# Patient Record
Sex: FEMALE | Race: WHITE | URBAN - METROPOLITAN AREA
[De-identification: names, ages, dates, MRNs, and addresses within clinical notes are randomized per-mention and may not be internally consistent; named-entity substitution may affect disease eponyms.]

---

## 2024-02-27 ENCOUNTER — OFFICE (OUTPATIENT)
Dept: URBAN - METROPOLITAN AREA CLINIC 76 | Facility: CLINIC | Age: 54
End: 2024-02-27
Payer: COMMERCIAL

## 2024-02-27 VITALS
OXYGEN SATURATION: 95 % | DIASTOLIC BLOOD PRESSURE: 76 MMHG | SYSTOLIC BLOOD PRESSURE: 122 MMHG | WEIGHT: 262 LBS | HEART RATE: 84 BPM | HEIGHT: 62 IN

## 2024-02-27 DIAGNOSIS — K21.9 GASTRO-ESOPHAGEAL REFLUX DISEASE WITHOUT ESOPHAGITIS: ICD-10-CM

## 2024-02-27 DIAGNOSIS — K22.70 BARRETT'S ESOPHAGUS WITHOUT DYSPLASIA: ICD-10-CM

## 2024-02-27 DIAGNOSIS — K58.1 IRRITABLE BOWEL SYNDROME WITH CONSTIPATION: ICD-10-CM

## 2024-02-27 PROCEDURE — 99204 OFFICE O/P NEW MOD 45 MIN: CPT | Performed by: INTERNAL MEDICINE

## 2024-02-27 RX ORDER — ESOMEPRAZOLE MAGNESIUM 40 MG/1
40 CAPSULE, DELAYED RELEASE PELLETS ORAL
Qty: 90 | Refills: 2 | Status: ACTIVE

## 2024-02-27 RX ORDER — LINACLOTIDE 72 UG/1
CAPSULE, GELATIN COATED ORAL
Qty: 90 | Refills: 2 | Status: ACTIVE
Start: 2024-02-27

## 2024-05-30 ENCOUNTER — OFFICE VISIT (OUTPATIENT)
Dept: ORTHOPEDIC SURGERY | Facility: CLINIC | Age: 54
End: 2024-05-30
Payer: COMMERCIAL

## 2024-05-30 VITALS — BODY MASS INDEX: 49.13 KG/M2 | HEIGHT: 62 IN | WEIGHT: 267 LBS | TEMPERATURE: 98.7 F

## 2024-05-30 DIAGNOSIS — M25.571 ACUTE RIGHT ANKLE PAIN: ICD-10-CM

## 2024-05-30 DIAGNOSIS — R52 PAIN: Primary | ICD-10-CM

## 2024-05-30 DIAGNOSIS — S86.001A ACHILLES TENDON INJURY, RIGHT, INITIAL ENCOUNTER: ICD-10-CM

## 2024-05-30 RX ORDER — AMITRIPTYLINE HYDROCHLORIDE 50 MG/1
TABLET, FILM COATED ORAL
COMMUNITY

## 2024-05-30 RX ORDER — CETIRIZINE HYDROCHLORIDE 10 MG/1
TABLET ORAL
COMMUNITY

## 2024-05-30 RX ORDER — LINACLOTIDE 72 UG/1
72 CAPSULE, GELATIN COATED ORAL
COMMUNITY
Start: 2024-02-27

## 2024-05-30 RX ORDER — PREGABALIN 100 MG/1
100 CAPSULE ORAL
COMMUNITY
Start: 2024-04-18

## 2024-05-30 RX ORDER — MELOXICAM 15 MG/1
15 TABLET ORAL
COMMUNITY
Start: 2024-04-18

## 2024-05-30 RX ORDER — INSULIN ASPART 100 [IU]/ML
INJECTION, SOLUTION INTRAVENOUS; SUBCUTANEOUS
COMMUNITY

## 2024-05-30 RX ORDER — SPIRONOLACTONE 25 MG/1
25 TABLET ORAL DAILY
COMMUNITY

## 2024-05-30 RX ORDER — AMLODIPINE BESYLATE 5 MG/1
5 TABLET ORAL DAILY
COMMUNITY

## 2024-05-30 RX ORDER — TRAZODONE HYDROCHLORIDE 50 MG/1
50 TABLET ORAL NIGHTLY PRN
COMMUNITY

## 2024-05-30 RX ORDER — ESOMEPRAZOLE MAGNESIUM 40 MG/1
1 CAPSULE, DELAYED RELEASE ORAL DAILY
COMMUNITY
Start: 2024-05-05

## 2024-05-30 RX ORDER — LORATADINE 10 MG/1
10 TABLET ORAL
COMMUNITY
Start: 2024-04-18

## 2024-05-30 RX ORDER — SEMAGLUTIDE 0.68 MG/ML
INJECTION, SOLUTION SUBCUTANEOUS
COMMUNITY
Start: 2024-05-21

## 2024-05-30 NOTE — PROGRESS NOTES
Patient Name: Marilin Leone   YOB: 1970  Referring Primary Care Physician: Olena Leavitt APRN  BMI: Body mass index is 48.83 kg/m².    Chief Complaint:    Chief Complaint   Patient presents with    Right Ankle - Pain, Follow-up    Right Foot - Pain, Follow-up        HPI: New pt that presents with pain in right ankle after stepping in a hole and fell on 5-22-24. No hx of ankle problems. Pt went to ER and was placed in a cam boot and walker and here for followup.     Marilin Leone is a 54 y.o. female who presents today for evaluation of   Chief Complaint   Patient presents with    Right Ankle - Pain, Follow-up    Right Foot - Pain, Follow-up         Subjective   Medications:   Home Medications:  Current Outpatient Medications on File Prior to Visit   Medication Sig    amitriptyline (ELAVIL) 50 MG tablet 30    amLODIPine (NORVASC) 5 MG tablet Take 1 tablet by mouth Daily.    ascorbic acid (VITAMIN C) 1000 MG tablet controlled-release CR tablet tablet    cetirizine (zyrTEC) 10 MG tablet TAKE 1 TABLET BY MOUTH EVERY MORNING FOR ALLERGIES    esomeprazole (nexIUM) 40 MG capsule Take 1 capsule by mouth Daily.    Linzess 72 MCG capsule capsule 1 capsule.    loratadine (CLARITIN) 10 MG tablet 1 tablet.    meloxicam (MOBIC) 15 MG tablet 1 tablet.    Methylcobalamin 1 MG chewable tablet tablet    NovoLOG 100 UNIT/ML injection USE AS DIRECTED VIA INSULIN PUMP MAX  UNITS DAILY    Ozempic, 0.25 or 0.5 MG/DOSE, 2 MG/3ML solution pen-injector INJECT 0.25MG INTO THE SKIN EVERY WEEK FOR 4 WEEKS THEN INCREASE TO 0.5MG EVERY WEEK IF TOLERATED    pregabalin (LYRICA) 100 MG capsule 1 capsule.    spironolactone (ALDACTONE) 25 MG tablet Take 1 tablet by mouth Daily.    traZODone (DESYREL) 50 MG tablet Take 1 tablet by mouth At Night As Needed. for sleep    vitamin D3 125 MCG (5000 UT) capsule capsule Take 1 capsule by mouth Daily.     No current facility-administered medications on file prior to visit.      Current Medications:  Scheduled Meds:  Continuous Infusions:No current facility-administered medications for this visit.    PRN Meds:.    I have reviewed the patient's medical history in detail and updated the computerized patient record.  Review and summarization of old records includes:    Past Medical History:   Diagnosis Date    Diabetes     HBP (high blood pressure)         Past Surgical History:   Procedure Laterality Date    BREAST BIOPSY Left     KIDNEY STONE SURGERY      OTHER SURGICAL HISTORY      abdominal abcess removed        Social History     Occupational History    Not on file   Tobacco Use    Smoking status: Former     Types: Cigarettes    Smokeless tobacco: Not on file   Substance and Sexual Activity    Alcohol use: Never    Drug use: Defer    Sexual activity: Defer      Social History     Social History Narrative    Not on file        Family History   Problem Relation Age of Onset    Diabetes Father     Heart disease Father     Hypertension Father        ROS: 14 point review of systems was performed and all other systems were reviewed and are negative except for documented findings in HPI and today's encounter.     Allergies: Not on File  Constitutional:  Denies fever, shaking or chills   Eyes:  Denies change in visual acuity   HENT:  Denies nasal congestion or sore throat   Respiratory:  Denies cough or shortness of breath   Cardiovascular:  Denies chest pain or severe LE edema   GI:  Denies abdominal pain, nausea, vomiting, bloody stools or diarrhea   Musculoskeletal:  Numbness, tingling, pain, or loss of motor function only as noted above in history of present illness.  : Denies painful urination or hematuria  Integument:  Denies rash, lesion or ulceration   Neurologic:  Denies headache or focal weakness  Endocrine:  Denies lymphadenopathy  Psych:  Denies confusion or change in mental status   Hem:  Denies active bleeding    OBJECTIVE:  Physical Exam: 54 y.o. female  Wt Readings from Last  "3 Encounters:   05/30/24 121 kg (267 lb)     Ht Readings from Last 1 Encounters:   05/30/24 157.5 cm (62\")     Body mass index is 48.83 kg/m².  Vitals:    05/30/24 1335   Temp: 98.7 °F (37.1 °C)     Vital signs reviewed.     General Appearance:    Alert, cooperative, in no acute distress                  Eyes: conjunctiva clear  ENT: external ears and nose atraumatic  CV: no peripheral edema  Resp: normal respiratory effort  Skin: no rashes or wounds; normal turgor  Psych: mood and affect appropriate  Lymph: no nodes appreciated  Neuro: gross sensation intact  Vascular:  Palpable peripheral pulse in noted extremity  Musculoskeletal Extremities: skin warm, dry and intact with calf soft and nttp, knee nttp, achilles ttp with edema and ecchymosis with palpable deficit and pain with dorsiflexion and plantar flexion     Radiology:   3 views right foot done for pain with no comparison view no fracture  3 views right ankle done for pain with no comparison views no fracture     Assessment:     ICD-10-CM ICD-9-CM   1. Pain  R52 780.96   2. Acute right ankle pain  M25.571 719.47     338.19   3. Achilles tendon injury, right, initial encounter  S86.001A 959.7        Procedures  Cam boot tall with heel lift and mri for achilles injury possible tear     MDM/Plan:   The diagnosis(es), natural history, pathophysiology and treatment for diagnosis(es) were discussed. Opportunity given and questions answered.  Biomechanics of pertinent body areas discussed.  When appropriate, the use of ambulatory aids discussed.  BMI:  The concept of BMI body mass index and its importance and implications discussed.    MEDICATIONS:  The risks, benefits, warnings,side effects and alternatives of medications discussed.  Inflammation/pain control; with cold, heat, elevation and/or liniments discussed as appropriate  MRI.  MEDICAL RECORDS reviewed from other provider(s) for past and current medical history pertinent to this " complaint.      5/30/2024    Much of this encounter note is an electronic transcription/translation of spoken language to printed text. The electronic translation of spoken language may permit erroneous, or at times, nonsensical words or phrases to be inadvertently transcribed; Although I have reviewed the note for such errors, some may still exist

## 2024-06-05 ENCOUNTER — TELEPHONE (OUTPATIENT)
Dept: ORTHOPEDIC SURGERY | Facility: CLINIC | Age: 54
End: 2024-06-05

## 2024-06-05 NOTE — TELEPHONE ENCOUNTER
Caller: Marilin Leone    Relationship to patient: Self    Best call back number: [phone]    Chief complaint: RIGHT ANKLE     Type of visit: MRI FOLLOW UP     Requested date: ASAP      If rescheduling, when is the original appointment: 6-10-24     Additional notes:PATIENTS RIGHT ANKLE MRI IS SCHEDULED FOR THE SAME DAY. FIRST AVAILABLE WITH DR JOY NOT UNTIL 7-1-24.

## 2024-06-10 ENCOUNTER — HOSPITAL ENCOUNTER (OUTPATIENT)
Dept: MRI IMAGING | Facility: HOSPITAL | Age: 54
Discharge: HOME | End: 2024-06-10
Admitting: NURSE PRACTITIONER
Payer: COMMERCIAL

## 2024-06-10 DIAGNOSIS — M25.571 ACUTE RIGHT ANKLE PAIN: ICD-10-CM

## 2024-06-10 DIAGNOSIS — R52 PAIN: ICD-10-CM

## 2024-06-10 DIAGNOSIS — S86.001A ACHILLES TENDON INJURY, RIGHT, INITIAL ENCOUNTER: ICD-10-CM

## 2024-06-10 PROCEDURE — 73721 MRI JNT OF LWR EXTRE W/O DYE: CPT

## 2024-06-13 ENCOUNTER — OFFICE VISIT (OUTPATIENT)
Dept: ORTHOPEDIC SURGERY | Facility: CLINIC | Age: 54
End: 2024-06-13
Payer: COMMERCIAL

## 2024-06-13 VITALS — TEMPERATURE: 97.3 F | WEIGHT: 267 LBS | HEIGHT: 62 IN | BODY MASS INDEX: 49.13 KG/M2

## 2024-06-13 DIAGNOSIS — M65.28 CALCIFIC ACHILLES TENDONITIS: ICD-10-CM

## 2024-06-13 DIAGNOSIS — S86.011A RUPTURE OF RIGHT ACHILLES TENDON, INITIAL ENCOUNTER: Primary | ICD-10-CM

## 2024-06-13 DIAGNOSIS — M92.61 HAGLUND'S DEFORMITY OF RIGHT HEEL: ICD-10-CM

## 2024-06-13 NOTE — PROGRESS NOTES
New Patient Complaint      Patient: Marilin Leone  YOB: 1970 54 y.o. female  Medical Record Number: [id number]    Chief Complaints: I hurt my ankle    History of Present Illness:   She injured her right Achilles on 5/22/2024 when she stepped into a hole at her grandsons school in the grass.  She felt and heard a pop.  She was seen at [facility] with x-rays that did not show any obvious fractures.  She subsequently saw Mary Grace on 5/30/2024 with findings of suspicion for Achilles injury.  She was fitted with a short boot at that time and recommended use of a walker and sent for MRI    Patient is seen today with moderate pain in the posterior aspect of her right hindfoot with history as outlined above without previous history of injury.  She does not currently work and lives with her mother.       HPI    Allergies:   Allergies   Allergen Reactions    Sulfamethoxazole Rash and Shortness Of Breath    Lisinopril Nausea Only    Metformin Nausea And Vomiting       Medications:   Current Outpatient Medications on File Prior to Visit   Medication Sig    amitriptyline (ELAVIL) 50 MG tablet 30    amLODIPine (NORVASC) 5 MG tablet Take 1 tablet by mouth Daily.    ascorbic acid (VITAMIN C) 1000 MG tablet controlled-release CR tablet tablet    cetirizine (zyrTEC) 10 MG tablet TAKE 1 TABLET BY MOUTH EVERY MORNING FOR ALLERGIES    esomeprazole (nexIUM) 40 MG capsule Take 1 capsule by mouth Daily.    Linzess 72 MCG capsule capsule 1 capsule.    loratadine (CLARITIN) 10 MG tablet 1 tablet.    meloxicam (MOBIC) 15 MG tablet 1 tablet.    Methylcobalamin 1 MG chewable tablet tablet    NovoLOG 100 UNIT/ML injection USE AS DIRECTED VIA INSULIN PUMP MAX  UNITS DAILY    Ozempic, 0.25 or 0.5 MG/DOSE, 2 MG/3ML solution pen-injector INJECT 0.25MG INTO THE SKIN EVERY WEEK FOR 4 WEEKS THEN INCREASE TO 0.5MG EVERY WEEK IF TOLERATED    pregabalin (LYRICA) 100 MG capsule 1 capsule.    spironolactone (ALDACTONE) 25 MG tablet Take 1  "tablet by mouth Daily.    traZODone (DESYREL) 50 MG tablet Take 1 tablet by mouth At Night As Needed. for sleep    vitamin D3 125 MCG (5000 UT) capsule capsule Take 1 capsule by mouth Daily.     No current facility-administered medications on file prior to visit.       Past Medical History:   Diagnosis Date    Diabetes     HBP (high blood pressure)      Past Surgical History:   Procedure Laterality Date    BREAST BIOPSY Left     KIDNEY STONE SURGERY      OTHER SURGICAL HISTORY      abdominal abcess removed     Social History     Occupational History    Not on file   Tobacco Use    Smoking status: Former     Types: Cigarettes    Smokeless tobacco: Not on file   Substance and Sexual Activity    Alcohol use: Never    Drug use: Defer    Sexual activity: Defer      Social History     Social History Narrative    Not on file     Family History   Problem Relation Age of Onset    Diabetes Father     Heart disease Father     Hypertension Father        Review of Systems: 14 point review of systems performed, positive pertinent findings identified in HPI. All remaining systems negative     Review of Systems      Physical Exam:   Vitals:    06/13/24 1057   Temp: 97.3 °F (36.3 °C)   Weight: 121 kg (267 lb)   Height: 157.5 cm (62\")   PainSc:   1     Physical Exam   Constitutional: pleasant, well developed   Eyes: sclera non icteric  Hearing : adequate for exam  Cardiovascular: palpable pulses in right foot, right calf/ thigh NT without sign of DVT  Respiratoy: breathing unlabored   Neurological: grossly sensate to LT throughout right LE  Psychiatric: oriented with normal mood and affect.   Lymphatic: No palpable popliteal lymphadenopathy right LE  Skin: intact throughout right leg/foot  Musculoskeletal: She has mild tenderness palpation at the insertion of the right Achilles and just proximal to this with some questionable palpable defect.  She had fairly symmetric resting length but had no appreciable heel motion with calf " compression.  She had mild tenderness and bony prominence over the insertion of the right Achilles.  Physical Exam  Ortho Exam    Radiology: 3 views of the right ankle including lateral view of the foot and 2 views of the right foot reviewed on 5/30/2024.  There is a prominent superior calcaneal tuberosity and large posterior calcaneal spurring as well as plantar calcaneal spurring no obvious avulsion fragments.  There is mild hallux valgus with metatarsus adductus.      MRI films and report of the right ankle dated 6/10/2024 reviewed which shows full-thickness or high-grade near full-thickness tear/avulsion of the distal Achilles from the posterior calcaneus with proximal retraction with only a few thin far posterior tendon fibers tendon remaining intact.  There is also Duyen deformity with posterior superior calcaneus and posterior spurring.  There is a small to moderate tibiotalar and small posterior subtalar fusion felt to be posttraumatic.  There is patchy marrow edema at the anterior distal tibial plafond and dorsal talar head and neck likely representing bone contusions.    Assessment/Plan:.  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  Right tibiotalar and posterior subtalar joint effusions with marrow edema at the distal plafond and dorsal talar head and neck thought to represent bone contusions and posttraumatic effusion    We discussed operative and nonoperative treatment options and do not really feel that this is amenable to nonoperative treatment given her age and activity level.    Reviewed her this may be difficult to perform a type of primary repair and will need to remove the Duyen deformity and spurs posteriorly and try to reattach the Achilles although it is of somewhat poor quality.  This may require lengthening from the calf or will more likely due to cadaver graft  to minimize chance of postoperative complications as far as extending further into the leg and still with persistent weakness of the calf.  Will also plan on doing an FHL tendon transfer from the great toe which she voiced clear understanding of the ramifications thereof.    She voiced clear understanding the operative procedure and postoperative course with associated risk benefits potential outcomes and complications which can include but are not limited to heart attack stroke death pneumonia infection bleeding damage to blood vessels nerves or tendons blood clots pulmonary embolism persistent or worsening pain stiffness instability need for subsequent surgery rerupture and failure to return to presurgery and precondition levels of activity as well as small risk of disease transmission from cadaver graft as well as wound healing complications could require long-term wound care or plastic reconstruction or even catastrophic complications resulting in loss of the leg.  Could also have some weakness of the great toe with pushoff after tendon transfer    She is fit with a taller boot with a double heel lift and recommend offloading with a walker.    We will see about getting her scheduled on outpatient basis on 6/25/2024.

## 2024-06-14 ENCOUNTER — TELEPHONE (OUTPATIENT)
Dept: ORTHOPEDIC SURGERY | Facility: CLINIC | Age: 54
End: 2024-06-14
Payer: COMMERCIAL

## 2024-06-14 PROBLEM — S86.011A ACHILLES RUPTURE, RIGHT: Status: ACTIVE | Noted: 2024-06-13

## 2024-06-14 PROBLEM — M92.61 HAGLUND'S DEFORMITY OF RIGHT HEEL: Status: ACTIVE | Noted: 2024-06-13

## 2024-06-14 NOTE — TELEPHONE ENCOUNTER
Hub staff attempted to follow warm transfer process and was unsuccessful     Caller: Marilin Leone    Relationship to patient: Self    Best call back number:[phone] (home)      Patient is needing:   PATIENT RECEIVED AN EMAIL FOR THINGS TO STOP BEFORE HER SX DATE OF  06/25/2024. ON THE   THINGS TO AVOID BEFORE SX IT   SAYS TO STOP CERTAIN DRUGS A WEEK BEFORE SX BUT SHE IS INSULIN DEPENDENT. PLEASE ADVISE.

## 2024-06-17 NOTE — TELEPHONE ENCOUNTER
Call returned to the patient.  States that she is insulin-dependent.  She is on Ozempic which she understands she has to stop 7 days prior to surgery but she also has an insulin pump.  Have advised her to discuss the insulin pump with the nurses and preadmission testing tomorrow.  Patient states that even with the pump her blood sugar usually runs 1 50-2 50.  There may be no reason to change the settings on the insulin pump however she may need to check with her PCP to see about lowering the dose in preparation for surgery

## 2024-06-18 ENCOUNTER — PRE-ADMISSION TESTING (OUTPATIENT)
Dept: PREADMISSION TESTING | Facility: HOSPITAL | Age: 54
End: 2024-06-18
Payer: COMMERCIAL

## 2024-06-18 VITALS
SYSTOLIC BLOOD PRESSURE: 143 MMHG | TEMPERATURE: 98 F | WEIGHT: 267 LBS | RESPIRATION RATE: 20 BRPM | OXYGEN SATURATION: 98 % | HEART RATE: 82 BPM | BODY MASS INDEX: 49.13 KG/M2 | HEIGHT: 62 IN | DIASTOLIC BLOOD PRESSURE: 82 MMHG

## 2024-06-18 LAB
ANION GAP SERPL CALCULATED.3IONS-SCNC: 6 MMOL/L (ref 5–15)
BUN SERPL-MCNC: 15 MG/DL (ref 6–20)
BUN/CREAT SERPL: 23.1 (ref 7–25)
CALCIUM SPEC-SCNC: 9.2 MG/DL (ref 8.6–10.5)
CHLORIDE SERPL-SCNC: 101 MMOL/L (ref 98–107)
CO2 SERPL-SCNC: 30 MMOL/L (ref 22–29)
CREAT SERPL-MCNC: 0.65 MG/DL (ref 0.57–1)
DEPRECATED RDW RBC AUTO: 44.2 FL (ref 37–54)
EGFRCR SERPLBLD CKD-EPI 2021: 104.8 ML/MIN/1.73
ERYTHROCYTE [DISTWIDTH] IN BLOOD BY AUTOMATED COUNT: 13.6 % (ref 12.3–15.4)
GLUCOSE SERPL-MCNC: 137 MG/DL (ref 65–99)
HCT VFR BLD AUTO: 38.9 % (ref 34–46.6)
HGB BLD-MCNC: 12.8 G/DL (ref 12–15.9)
MCH RBC QN AUTO: 29.5 PG (ref 26.6–33)
MCHC RBC AUTO-ENTMCNC: 32.9 G/DL (ref 31.5–35.7)
MCV RBC AUTO: 89.6 FL (ref 79–97)
PLATELET # BLD AUTO: 128 10*3/MM3 (ref 140–450)
PMV BLD AUTO: 11 FL (ref 6–12)
POTASSIUM SERPL-SCNC: 4.3 MMOL/L (ref 3.5–5.2)
RBC # BLD AUTO: 4.34 10*6/MM3 (ref 3.77–5.28)
SODIUM SERPL-SCNC: 137 MMOL/L (ref 136–145)
WBC NRBC COR # BLD AUTO: 8.56 10*3/MM3 (ref 3.4–10.8)

## 2024-06-18 PROCEDURE — 36415 COLL VENOUS BLD VENIPUNCTURE: CPT

## 2024-06-18 PROCEDURE — 85027 COMPLETE CBC AUTOMATED: CPT

## 2024-06-18 PROCEDURE — 93005 ELECTROCARDIOGRAM TRACING: CPT

## 2024-06-18 PROCEDURE — 80048 BASIC METABOLIC PNL TOTAL CA: CPT

## 2024-06-18 RX ORDER — ACETAMINOPHEN 325 MG/1
325-650 TABLET ORAL EVERY 6 HOURS PRN
COMMUNITY

## 2024-06-18 NOTE — DISCHARGE INSTRUCTIONS
"Holding GLP-1 Receptor Agonists Prior to Anesthesia    In order for safe anesthesia, GLP-1 receptor agonist mediations need to be held before the procedure.     What are GLP-1 Receptor Agonists?  Glucagon-like peptide-1 (GLP-1) receptor agonists are approved by the Food and Drug Administration for treatment of type 2 diabetes mellitus and cardiovascular risk reduction. In addition, GLP-1 receptor agonists are also used for weight loss.     Which of my medications are GLP-1 Receptor Agonists?   Exanetide (Byetta®, Bydureon®)  Lixisenatide (Adlyxin®, Soliqua®)  Semaglutide (Wegovy®, Ozempic®, Rybelsus®)  Liraglutide (Saxenda®, Victoza®, Xutolphy®)  Dulaglutide (Trulicity®)   Tirzepatide (Mounjaro®, Zepbound®)    How can GLP-1 Receptor Agonists cause problems during surgery?  GLP-1 agonists can potentially cause adverse gastrointestinal effects, including the consequences of delayed gastric emptying. This can cause the stomach to be full even after refraining from eating and drinking before surgery and can cause regurgitation or \"throwing up\" of the stomach contents during anesthesia. If the contents enter the airway and the lungs, it could cause anesthesia complications and a severe pneumonia.     What should I do prior to my procedure?  According to the recommendations of the American Society of Anesthesiologists:    If you take GLP-1 agonists every day: Hold the medication on the day of the procedure/surgery.   If you take GLP-1 agonists once a week: Hold the medication a week prior to the procedure/surgery    What if I have questions?  If you have concerns or questions about holding your medications prior to your procedure, contact your doctors before stopping your medications.    Modified from the American Society of Anesthesiologists Consensus-Based Guidance on Preoperative Management of Patients (Adults and Children) on Glucagon-Like Peptide-1 (GLP-1) Receptor Agonists June 29,2023      Take the following " medications the morning of surgery:    Nexium   zyrtec   amlodipine   lyrica    If you are on prescription narcotic pain medication to control your pain you may also take that medication the morning of surgery.    General Instructions:  Do not eat solid food after midnight the night before surgery.  You may drink clear liquids day of surgery but must stop at least one hour before your hospital arrival time.  It is beneficial for you to have a clear drink that contains carbohydrates the day of surgery.  We suggest a 12 to 20 ounce bottle of Gatorade or Powerade for non-diabetic patients or a 12 to 20 ounce bottle of G2 or Powerade Zero for diabetic patients. (Pediatric patients, are not advised to drink a 12 to 20 ounce carbohydrate drink)    Clear liquids are liquids you can see through.  Nothing red in color.     Plain water                               Sports drinks  Sodas                                   Gelatin (Jell-O)  Fruit juices without pulp such as white grape juice and apple juice  Popsicles that contain no fruit or yogurt  Tea or coffee (no cream or milk added)  Gatorade / Powerade  G2 / Powerade Zero    Infants may have breast milk up to four hours before surgery.  Infants drinking formula may drink formula up to six hours before surgery.   Patients who avoid smoking, chewing tobacco and alcohol for 4 weeks prior to surgery have a reduced risk of post-operative complications.  Quit smoking as many days before surgery as you can.  Do not smoke, use chewing tobacco or drink alcohol the day of surgery.   If applicable bring your C-PAP/ BI-PAP machine in with you to preop day of surgery.  Bring any papers given to you in the doctor’s office.  Wear clean comfortable clothes.  Do not wear contact lenses, false eyelashes or make-up.  Bring a case for your glasses.   Bring crutches or walker if applicable.  Remove all piercings.  Leave jewelry and any other valuables at home.  Hair extensions with metal clips  must be removed prior to surgery.  The Pre-Admission Testing nurse will instruct you to bring medications if unable to obtain an accurate list in Pre-Admission Testing.        If you were given a blood bank ID arm band remember to bring it with you the day of surgery.    Preventing a Surgical Site Infection:  For 2 to 3 days before surgery, avoid shaving with a razor because the razor can irritate skin and make it easier to develop an infection.    Any areas of open skin can increase the risk of a post-operative wound infection by allowing bacteria to enter and travel throughout the body.  Notify your surgeon if you have any skin wounds / rashes even if it is not near the expected surgical site.  The area will need assessed to determine if surgery should be delayed until it is healed.  The night prior to surgery shower using a fresh bar of anti-bacterial soap (such as Dial) and clean washcloth.  Sleep in a clean bed with clean clothing.  Do not allow pets to sleep with you.  Shower on the morning of surgery using a fresh bar of anti-bacterial soap (such as Dial) and clean washcloth.  Dry with a clean towel and dress in clean clothing.  Ask your surgeon if you will be receiving antibiotics prior to surgery.  Make sure you, your family, and all healthcare providers clean their hands with soap and water or an alcohol based hand  before caring for you or your wound.    Day of surgery:  Your arrival time is approximately two hours before your scheduled surgery time.  Upon arrival, a Pre-op nurse and Anesthesiologist will review your health history, obtain vital signs, and answer questions you may have.  The only belongings needed at this time will be a list of your home medications and if applicable your C-PAP/BI-PAP machine.  A Pre-op nurse will start an IV and you may receive medication in preparation for surgery, including something to help you relax.     Please be aware that surgery does come with  discomfort.  We want to make every effort to control your discomfort so please discuss any uncontrolled symptoms with your nurse.   Your doctor will most likely have prescribed pain medications.      If you are going home after surgery you will receive individualized written care instructions before being discharged.  A responsible adult must drive you to and from the hospital on the day of your surgery and ideally stay with you through the night.   .  Discharge prescriptions can be filled by the hospital pharmacy during regular pharmacy hours.  If you are having surgery late in the day/evening your prescription may be e-prescribed to your pharmacy.  Please verify your pharmacy hours or chose a 24 hour pharmacy to avoid not having access to your prescription because your pharmacy has closed for the day.    If you are staying overnight following surgery, you will be transported to your hospital room following the recovery period.  [facility] has all private rooms.    If you have any questions please call Pre-Admission Testing at [phone].  Deductibles and co-payments are collected on the day of service. Please be prepared to pay the required co-pay, deductible or deposit on the day of service as defined by your plan.    Call your surgeon immediately if you experience any of the following symptoms:  Sore Throat  Shortness of Breath or difficulty breathing  Cough  Chills  Body soreness or muscle pain  Headache  Fever  New loss of taste or smell  Do not arrive for your surgery ill.  Your procedure will need to be rescheduled to another time.  You will need to call your physician before the day of surgery to avoid any unnecessary exposure to hospital staff as well as other patients.

## 2024-06-19 LAB
QT INTERVAL: 366 MS
QTC INTERVAL: 417 MS

## 2024-06-24 NOTE — H&P
Patient: Marilin Leone  YOB: 1970 54 y.o. female  Medical Record Number: [id number]     Chief Complaints: I hurt my ankle     History of Present Illness:   She was seen on 6/13/2024 reporting that she injured her right Achilles on 5/22/2024 when she stepped into a hole at her grandson's school in the grass.  She felt and heard a pop.  She was seen at [facility] with x-rays that did not show any obvious fractures.  She subsequently saw Mary Grace on 5/30/2024 with findings of suspicion for Achilles injury.  She was fitted with a short boot at that time and recommended use of a walker and sent for MRI     Patient patient was seen on 6/13/2024 with moderate pain in the posterior aspect of her right hindfoot with history as outlined above without previous history of injury.  She did not currently work and was living with her mother.        HPI     Allergies:   Allergies        Allergies   Allergen Reactions    Sulfamethoxazole Rash and Shortness Of Breath    Lisinopril Nausea Only    Metformin Nausea And Vomiting            Medications:   Medications Ordered Prior to Encounter        Current Outpatient Medications on File Prior to Visit   Medication Sig    amitriptyline (ELAVIL) 50 MG tablet 30    amLODIPine (NORVASC) 5 MG tablet Take 1 tablet by mouth Daily.    ascorbic acid (VITAMIN C) 1000 MG tablet controlled-release CR tablet tablet    cetirizine (zyrTEC) 10 MG tablet TAKE 1 TABLET BY MOUTH EVERY MORNING FOR ALLERGIES    esomeprazole (nexIUM) 40 MG capsule Take 1 capsule by mouth Daily.    Linzess 72 MCG capsule capsule 1 capsule.    loratadine (CLARITIN) 10 MG tablet 1 tablet.    meloxicam (MOBIC) 15 MG tablet 1 tablet.    Methylcobalamin 1 MG chewable tablet tablet    NovoLOG 100 UNIT/ML injection USE AS DIRECTED VIA INSULIN PUMP MAX  UNITS DAILY    Ozempic, 0.25 or 0.5 MG/DOSE, 2 MG/3ML solution pen-injector INJECT 0.25MG INTO THE SKIN EVERY WEEK FOR 4 WEEKS THEN INCREASE TO 0.5MG EVERY WEEK IF  "TOLERATED    pregabalin (LYRICA) 100 MG capsule 1 capsule.    spironolactone (ALDACTONE) 25 MG tablet Take 1 tablet by mouth Daily.    traZODone (DESYREL) 50 MG tablet Take 1 tablet by mouth At Night As Needed. for sleep    vitamin D3 125 MCG (5000 UT) capsule capsule Take 1 capsule by mouth Daily.      No current facility-administered medications on file prior to visit.            Medical History        Past Medical History:   Diagnosis Date    Diabetes      HBP (high blood pressure)           Surgical History[]Expand by Default         Past Surgical History:   Procedure Laterality Date    BREAST BIOPSY Left      KIDNEY STONE SURGERY        OTHER SURGICAL HISTORY         abdominal abcess removed         Social History            Occupational History    Not on file   Tobacco Use    Smoking status: Former       Types: Cigarettes    Smokeless tobacco: Not on file   Substance and Sexual Activity    Alcohol use: Never    Drug use: Defer    Sexual activity: Defer      Social History          Social History Narrative    Not on file            Family History   Problem Relation Age of Onset    Diabetes Father      Heart disease Father      Hypertension Father           Review of Systems: 14 point review of systems performed, positive pertinent findings identified in HPI. All remaining systems negative      Review of Systems        Physical Exam:   Vitals       Vitals:     06/13/24 1057   Temp: 97.3 °F (36.3 °C)   Weight: 121 kg (267 lb)   Height: 157.5 cm (62\")   PainSc:   1      Performed 6/13/2024  Physical Exam   Constitutional: pleasant, well developed   Eyes: sclera non icteric  Hearing : adequate for exam  Cardiovascular: palpable pulses in right foot, right calf/ thigh NT without sign of DVT  Respiratoy: breathing unlabored   Neurological: grossly sensate to LT throughout right LE  Psychiatric: oriented with normal mood and affect.   Lymphatic: No palpable popliteal lymphadenopathy right LE  Skin: intact throughout " right leg/foot  Musculoskeletal: She has mild tenderness palpation at the insertion of the right Achilles and just proximal to this with some questionable palpable defect.  She had fairly symmetric resting length but had no appreciable heel motion with calf compression.  She had mild tenderness and bony prominence over the insertion of the right Achilles.  Physical Exam  Ortho Exam     Radiology: 3 views of the right ankle including lateral view of the foot and 2 views of the right foot reviewed on 5/30/2024.  There is a prominent superior calcaneal tuberosity and large posterior calcaneal spurring as well as plantar calcaneal spurring no obvious avulsion fragments.  There is mild hallux valgus with metatarsus adductus.        MRI films and report of the right ankle dated 6/10/2024 reviewed which shows full-thickness or high-grade near full-thickness tear/avulsion of the distal Achilles from the posterior calcaneus with proximal retraction with only a few thin far posterior tendon fibers tendon remaining intact.  There is also Duyen deformity with posterior superior calcaneus and posterior spurring.  There is a small to moderate tibiotalar and small posterior subtalar fusion felt to be posttraumatic.  There is patchy marrow edema at the anterior distal tibial plafond and dorsal talar head and neck likely representing bone contusions.     Assessment/Plan:.  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  Right tibiotalar and posterior subtalar joint effusions with marrow edema at the distal plafond and dorsal talar head and neck thought to represent bone contusions and posttraumatic effusion     1324 discussed operative and nonoperative treatment options and do not really feel that this is amenable to nonoperative treatment given her age and activity level.     Reviewed her this may be  difficult to perform a type of primary repair and will need to remove the Duyen deformity and spurs posteriorly and try to reattach the Achilles although it is of somewhat poor quality.  This may require lengthening from the calf or will more likely due to cadaver graft to minimize chance of postoperative complications as far as extending further into the leg and with subsequent persistent weakness of the calf.  Will also plan on doing an FHL tendon transfer from the great toe which she voiced clear understanding of the ramifications thereof.     She voiced clear understanding the operative procedure and postoperative course with associated risk benefits potential outcomes and complications which can include but are not limited to heart attack stroke death pneumonia infection bleeding damage to blood vessels nerves or tendons blood clots pulmonary embolism persistent or worsening pain stiffness instability need for subsequent surgery rerupture and failure to return to presurgery and precondition levels of activity as well as small risk of disease transmission from cadaver graft as well as wound healing complications could require long-term wound care or plastic reconstruction or even catastrophic complications resulting in loss of the leg.  Could also have some weakness of the great toe with pushoff after tendon transfer     She was did with a taller boot with a double heel lift and recommend offloading with a walker.     He was scheduled on outpatient basis on 6/25/2024.     Copied text in this note has been reviewed by me and is accurate as of  06/24/24 .

## 2024-06-25 ENCOUNTER — HOSPITAL ENCOUNTER (OUTPATIENT)
Facility: HOSPITAL | Age: 54
Setting detail: HOSPITAL OUTPATIENT SURGERY
Discharge: HOME | End: 2024-06-25
Attending: ORTHOPAEDIC SURGERY | Admitting: ORTHOPAEDIC SURGERY
Payer: COMMERCIAL

## 2024-06-25 ENCOUNTER — APPOINTMENT (OUTPATIENT)
Dept: GENERAL RADIOLOGY | Facility: HOSPITAL | Age: 54
End: 2024-06-25
Payer: COMMERCIAL

## 2024-06-25 ENCOUNTER — ANESTHESIA EVENT (OUTPATIENT)
Dept: PERIOP | Facility: HOSPITAL | Age: 54
End: 2024-06-25
Payer: COMMERCIAL

## 2024-06-25 ENCOUNTER — ANESTHESIA (OUTPATIENT)
Dept: PERIOP | Facility: HOSPITAL | Age: 54
End: 2024-06-25
Payer: COMMERCIAL

## 2024-06-25 VITALS
OXYGEN SATURATION: 96 % | DIASTOLIC BLOOD PRESSURE: 69 MMHG | RESPIRATION RATE: 16 BRPM | TEMPERATURE: 98 F | SYSTOLIC BLOOD PRESSURE: 136 MMHG | HEART RATE: 90 BPM

## 2024-06-25 DIAGNOSIS — M92.61 HAGLUND'S DEFORMITY OF RIGHT HEEL: ICD-10-CM

## 2024-06-25 DIAGNOSIS — M65.28 CALCIFIC ACHILLES TENDONITIS: ICD-10-CM

## 2024-06-25 DIAGNOSIS — S86.011A RUPTURE OF RIGHT ACHILLES TENDON, INITIAL ENCOUNTER: ICD-10-CM

## 2024-06-25 LAB
GLUCOSE BLDC GLUCOMTR-MCNC: 176 MG/DL (ref 70–130)
GLUCOSE BLDC GLUCOMTR-MCNC: 218 MG/DL (ref 70–130)

## 2024-06-25 PROCEDURE — 28120 PART REMOVAL OF ANKLE/HEEL: CPT | Performed by: ORTHOPAEDIC SURGERY

## 2024-06-25 PROCEDURE — 25010000002 ROPIVACAINE PER 1 MG: Performed by: ANESTHESIOLOGY

## 2024-06-25 PROCEDURE — 27691 REVISE LOWER LEG TENDON: CPT | Performed by: ORTHOPAEDIC SURGERY

## 2024-06-25 PROCEDURE — 25010000002 FENTANYL CITRATE (PF) 50 MCG/ML SOLUTION: Performed by: ANESTHESIOLOGY

## 2024-06-25 PROCEDURE — 25010000002 SUGAMMADEX 200 MG/2ML SOLUTION: Performed by: NURSE ANESTHETIST, CERTIFIED REGISTERED

## 2024-06-25 PROCEDURE — C1713 ANCHOR/SCREW BN/BN,TIS/BN: HCPCS | Performed by: ORTHOPAEDIC SURGERY

## 2024-06-25 PROCEDURE — 25010000002 MIDAZOLAM PER 1 MG: Performed by: ANESTHESIOLOGY

## 2024-06-25 PROCEDURE — 76000 FLUOROSCOPY <1 HR PHYS/QHP: CPT

## 2024-06-25 PROCEDURE — 76000 FLUOROSCOPY <1 HR PHYS/QHP: CPT | Performed by: ORTHOPAEDIC SURGERY

## 2024-06-25 PROCEDURE — 25010000002 CEFAZOLIN 3 G RECONSTITUTED SOLUTION 1 EACH VIAL: Performed by: ORTHOPAEDIC SURGERY

## 2024-06-25 PROCEDURE — 82948 REAGENT STRIP/BLOOD GLUCOSE: CPT

## 2024-06-25 PROCEDURE — 25010000002 FENTANYL CITRATE (PF) 50 MCG/ML SOLUTION: Performed by: NURSE ANESTHETIST, CERTIFIED REGISTERED

## 2024-06-25 PROCEDURE — 27654 REPAIR OF ACHILLES TENDON: CPT | Performed by: ORTHOPAEDIC SURGERY

## 2024-06-25 PROCEDURE — 25010000002 GLYCOPYRROLATE 1 MG/5ML SOLUTION: Performed by: NURSE ANESTHETIST, CERTIFIED REGISTERED

## 2024-06-25 PROCEDURE — 25010000002 PROPOFOL 10 MG/ML EMULSION: Performed by: NURSE ANESTHETIST, CERTIFIED REGISTERED

## 2024-06-25 PROCEDURE — 25010000002 DEXAMETHASONE PER 1 MG: Performed by: ANESTHESIOLOGY

## 2024-06-25 PROCEDURE — 25810000003 LACTATED RINGERS PER 1000 ML: Performed by: ANESTHESIOLOGY

## 2024-06-25 PROCEDURE — 25010000002 ONDANSETRON PER 1 MG: Performed by: NURSE ANESTHETIST, CERTIFIED REGISTERED

## 2024-06-25 DEVICE — SCRW BIOCOMP TENODESIS 6.25X15MM: Type: IMPLANTABLE DEVICE | Site: ACHILLES TENDON | Status: FUNCTIONAL

## 2024-06-25 DEVICE — SUT TP LP 1.3MM 20IN W/76MM STR NDL WHT/BLU: Type: IMPLANTABLE DEVICE | Site: ACHILLES TENDON | Status: FUNCTIONAL

## 2024-06-25 DEVICE — TNDN VERSAGRAFT PRE-SUT FZ STRL: Type: IMPLANTABLE DEVICE | Site: ACHILLES TENDON | Status: FUNCTIONAL

## 2024-06-25 DEVICE — SEAL HEMO SURG ARISTA/AH ABS/PWDR 3GM: Type: IMPLANTABLE DEVICE | Site: ACHILLES TENDON | Status: FUNCTIONAL

## 2024-06-25 DEVICE — SUT FW #2 W/TPR NDL 1/2 CIR 38IN 97CM 26.5MM BLU: Type: IMPLANTABLE DEVICE | Site: ACHILLES TENDON | Status: FUNCTIONAL

## 2024-06-25 DEVICE — KT BIOTENODESIS W/FW4PK: Type: IMPLANTABLE DEVICE | Site: ACHILLES TENDON | Status: FUNCTIONAL

## 2024-06-25 DEVICE — SCRW BIOCOMP TENODESIS 4.75X15MM: Type: IMPLANTABLE DEVICE | Site: ACHILLES TENDON | Status: FUNCTIONAL

## 2024-06-25 RX ORDER — BUPIVACAINE HYDROCHLORIDE AND EPINEPHRINE 2.5; 5 MG/ML; UG/ML
INJECTION, SOLUTION EPIDURAL; INFILTRATION; INTRACAUDAL; PERINEURAL
Status: COMPLETED | OUTPATIENT
Start: 2024-06-25 | End: 2024-06-25

## 2024-06-25 RX ORDER — OXYCODONE AND ACETAMINOPHEN 7.5; 325 MG/1; MG/1
1 TABLET ORAL EVERY 4 HOURS PRN
Status: DISCONTINUED | OUTPATIENT
Start: 2024-06-25 | End: 2024-06-25 | Stop reason: HOSPADM

## 2024-06-25 RX ORDER — ROCURONIUM BROMIDE 10 MG/ML
INJECTION, SOLUTION INTRAVENOUS AS NEEDED
Status: DISCONTINUED | OUTPATIENT
Start: 2024-06-25 | End: 2024-06-25 | Stop reason: SURG

## 2024-06-25 RX ORDER — DROPERIDOL 2.5 MG/ML
0.62 INJECTION, SOLUTION INTRAMUSCULAR; INTRAVENOUS
Status: DISCONTINUED | OUTPATIENT
Start: 2024-06-25 | End: 2024-06-25 | Stop reason: HOSPADM

## 2024-06-25 RX ORDER — GLYCOPYRROLATE 0.2 MG/ML
INJECTION INTRAMUSCULAR; INTRAVENOUS AS NEEDED
Status: DISCONTINUED | OUTPATIENT
Start: 2024-06-25 | End: 2024-06-25 | Stop reason: SURG

## 2024-06-25 RX ORDER — DIPHENHYDRAMINE HYDROCHLORIDE 50 MG/ML
12.5 INJECTION INTRAMUSCULAR; INTRAVENOUS
Status: DISCONTINUED | OUTPATIENT
Start: 2024-06-25 | End: 2024-06-25 | Stop reason: HOSPADM

## 2024-06-25 RX ORDER — SODIUM CHLORIDE 0.9 % (FLUSH) 0.9 %
3 SYRINGE (ML) INJECTION EVERY 12 HOURS SCHEDULED
Status: DISCONTINUED | OUTPATIENT
Start: 2024-06-25 | End: 2024-06-25 | Stop reason: HOSPADM

## 2024-06-25 RX ORDER — DEXAMETHASONE SODIUM PHOSPHATE 4 MG/ML
INJECTION, SOLUTION INTRA-ARTICULAR; INTRALESIONAL; INTRAMUSCULAR; INTRAVENOUS; SOFT TISSUE
Status: COMPLETED | OUTPATIENT
Start: 2024-06-25 | End: 2024-06-25

## 2024-06-25 RX ORDER — SODIUM CHLORIDE, SODIUM LACTATE, POTASSIUM CHLORIDE, CALCIUM CHLORIDE 600; 310; 30; 20 MG/100ML; MG/100ML; MG/100ML; MG/100ML
9 INJECTION, SOLUTION INTRAVENOUS CONTINUOUS
Status: DISCONTINUED | OUTPATIENT
Start: 2024-06-25 | End: 2024-06-25 | Stop reason: HOSPADM

## 2024-06-25 RX ORDER — CEPHALEXIN 500 MG/1
500 CAPSULE ORAL EVERY 6 HOURS
Qty: 8 CAPSULE | Refills: 0 | Status: SHIPPED | OUTPATIENT
Start: 2024-06-25 | End: 2024-06-27

## 2024-06-25 RX ORDER — FLUMAZENIL 0.1 MG/ML
0.2 INJECTION INTRAVENOUS AS NEEDED
Status: DISCONTINUED | OUTPATIENT
Start: 2024-06-25 | End: 2024-06-25 | Stop reason: HOSPADM

## 2024-06-25 RX ORDER — PROMETHAZINE HYDROCHLORIDE 25 MG/1
25 SUPPOSITORY RECTAL ONCE AS NEEDED
Status: DISCONTINUED | OUTPATIENT
Start: 2024-06-25 | End: 2024-06-25 | Stop reason: HOSPADM

## 2024-06-25 RX ORDER — MIDAZOLAM HYDROCHLORIDE 1 MG/ML
2 INJECTION INTRAMUSCULAR; INTRAVENOUS
Status: DISCONTINUED | OUTPATIENT
Start: 2024-06-25 | End: 2024-06-25 | Stop reason: HOSPADM

## 2024-06-25 RX ORDER — IPRATROPIUM BROMIDE AND ALBUTEROL SULFATE 2.5; .5 MG/3ML; MG/3ML
3 SOLUTION RESPIRATORY (INHALATION) ONCE AS NEEDED
Status: DISCONTINUED | OUTPATIENT
Start: 2024-06-25 | End: 2024-06-25 | Stop reason: HOSPADM

## 2024-06-25 RX ORDER — LIDOCAINE HYDROCHLORIDE 20 MG/ML
INJECTION, SOLUTION INFILTRATION; PERINEURAL AS NEEDED
Status: DISCONTINUED | OUTPATIENT
Start: 2024-06-25 | End: 2024-06-25 | Stop reason: SURG

## 2024-06-25 RX ORDER — PROMETHAZINE HYDROCHLORIDE 25 MG/1
25 TABLET ORAL ONCE AS NEEDED
Status: DISCONTINUED | OUTPATIENT
Start: 2024-06-25 | End: 2024-06-25 | Stop reason: HOSPADM

## 2024-06-25 RX ORDER — NALOXONE HCL 0.4 MG/ML
0.2 VIAL (ML) INJECTION AS NEEDED
Status: DISCONTINUED | OUTPATIENT
Start: 2024-06-25 | End: 2024-06-25 | Stop reason: HOSPADM

## 2024-06-25 RX ORDER — OXYCODONE HYDROCHLORIDE AND ACETAMINOPHEN 5; 325 MG/1; MG/1
1-2 TABLET ORAL EVERY 4 HOURS PRN
Qty: 42 TABLET | Refills: 0 | Status: SHIPPED | OUTPATIENT
Start: 2024-06-25

## 2024-06-25 RX ORDER — LABETALOL HYDROCHLORIDE 5 MG/ML
5 INJECTION, SOLUTION INTRAVENOUS
Status: DISCONTINUED | OUTPATIENT
Start: 2024-06-25 | End: 2024-06-25 | Stop reason: HOSPADM

## 2024-06-25 RX ORDER — ONDANSETRON 2 MG/ML
4 INJECTION INTRAMUSCULAR; INTRAVENOUS ONCE AS NEEDED
Status: DISCONTINUED | OUTPATIENT
Start: 2024-06-25 | End: 2024-06-25 | Stop reason: HOSPADM

## 2024-06-25 RX ORDER — SODIUM CHLORIDE 0.9 % (FLUSH) 0.9 %
3-10 SYRINGE (ML) INJECTION AS NEEDED
Status: DISCONTINUED | OUTPATIENT
Start: 2024-06-25 | End: 2024-06-25 | Stop reason: HOSPADM

## 2024-06-25 RX ORDER — DEXAMETHASONE SODIUM PHOSPHATE 4 MG/ML
INJECTION, SOLUTION INTRA-ARTICULAR; INTRALESIONAL; INTRAMUSCULAR; INTRAVENOUS; SOFT TISSUE AS NEEDED
Status: DISCONTINUED | OUTPATIENT
Start: 2024-06-25 | End: 2024-06-25 | Stop reason: SURG

## 2024-06-25 RX ORDER — FENTANYL CITRATE 50 UG/ML
100 INJECTION, SOLUTION INTRAMUSCULAR; INTRAVENOUS
Status: DISCONTINUED | OUTPATIENT
Start: 2024-06-25 | End: 2024-06-25 | Stop reason: HOSPADM

## 2024-06-25 RX ORDER — HYDROCODONE BITARTRATE AND ACETAMINOPHEN 5; 325 MG/1; MG/1
1 TABLET ORAL ONCE AS NEEDED
Status: DISCONTINUED | OUTPATIENT
Start: 2024-06-25 | End: 2024-06-25 | Stop reason: HOSPADM

## 2024-06-25 RX ORDER — FENTANYL CITRATE 50 UG/ML
50 INJECTION, SOLUTION INTRAMUSCULAR; INTRAVENOUS
Status: DISCONTINUED | OUTPATIENT
Start: 2024-06-25 | End: 2024-06-25 | Stop reason: HOSPADM

## 2024-06-25 RX ORDER — ONDANSETRON 2 MG/ML
INJECTION INTRAMUSCULAR; INTRAVENOUS AS NEEDED
Status: DISCONTINUED | OUTPATIENT
Start: 2024-06-25 | End: 2024-06-25 | Stop reason: SURG

## 2024-06-25 RX ORDER — HYDRALAZINE HYDROCHLORIDE 20 MG/ML
5 INJECTION INTRAMUSCULAR; INTRAVENOUS
Status: DISCONTINUED | OUTPATIENT
Start: 2024-06-25 | End: 2024-06-25 | Stop reason: HOSPADM

## 2024-06-25 RX ORDER — PROPOFOL 10 MG/ML
VIAL (ML) INTRAVENOUS AS NEEDED
Status: DISCONTINUED | OUTPATIENT
Start: 2024-06-25 | End: 2024-06-25 | Stop reason: SURG

## 2024-06-25 RX ORDER — FAMOTIDINE 10 MG/ML
20 INJECTION, SOLUTION INTRAVENOUS ONCE
Status: COMPLETED | OUTPATIENT
Start: 2024-06-25 | End: 2024-06-25

## 2024-06-25 RX ORDER — ASPIRIN 325 MG
325 TABLET, DELAYED RELEASE (ENTERIC COATED) ORAL EVERY 12 HOURS SCHEDULED
Qty: 90 TABLET | Refills: 0 | Status: SHIPPED | OUTPATIENT
Start: 2024-06-26

## 2024-06-25 RX ORDER — SUCCINYLCHOLINE/SOD CL,ISO/PF 200MG/10ML
SYRINGE (ML) INTRAVENOUS AS NEEDED
Status: DISCONTINUED | OUTPATIENT
Start: 2024-06-25 | End: 2024-06-25 | Stop reason: SURG

## 2024-06-25 RX ORDER — EPHEDRINE SULFATE 50 MG/ML
5 INJECTION, SOLUTION INTRAVENOUS ONCE AS NEEDED
Status: DISCONTINUED | OUTPATIENT
Start: 2024-06-25 | End: 2024-06-25 | Stop reason: HOSPADM

## 2024-06-25 RX ORDER — ROPIVACAINE HYDROCHLORIDE 5 MG/ML
INJECTION, SOLUTION EPIDURAL; INFILTRATION; PERINEURAL
Status: COMPLETED | OUTPATIENT
Start: 2024-06-25 | End: 2024-06-25

## 2024-06-25 RX ORDER — HYDROMORPHONE HYDROCHLORIDE 1 MG/ML
0.5 INJECTION, SOLUTION INTRAMUSCULAR; INTRAVENOUS; SUBCUTANEOUS
Status: DISCONTINUED | OUTPATIENT
Start: 2024-06-25 | End: 2024-06-25 | Stop reason: HOSPADM

## 2024-06-25 RX ORDER — OXYCODONE HYDROCHLORIDE AND ACETAMINOPHEN 5; 325 MG/1; MG/1
1 TABLET ORAL ONCE AS NEEDED
Status: DISCONTINUED | OUTPATIENT
Start: 2024-06-25 | End: 2024-06-25 | Stop reason: HOSPADM

## 2024-06-25 RX ADMIN — Medication 200 MG: at 09:43

## 2024-06-25 RX ADMIN — CEFAZOLIN 3000 MG: 3 INJECTION, POWDER, FOR SOLUTION INTRAMUSCULAR; INTRAVENOUS at 09:34

## 2024-06-25 RX ADMIN — BUPIVACAINE HYDROCHLORIDE AND EPINEPHRINE BITARTRATE 10 ML: 2.5; .005 INJECTION, SOLUTION EPIDURAL; INFILTRATION; INTRACAUDAL; PERINEURAL at 08:21

## 2024-06-25 RX ADMIN — ROCURONIUM BROMIDE 50 MG: 10 INJECTION, SOLUTION INTRAVENOUS at 09:48

## 2024-06-25 RX ADMIN — PROPOFOL 300 MG: 10 INJECTION, EMULSION INTRAVENOUS at 09:43

## 2024-06-25 RX ADMIN — DEXAMETHASONE SODIUM PHOSPHATE 8 MG: 4 INJECTION, SOLUTION INTRA-ARTICULAR; INTRALESIONAL; INTRAMUSCULAR; INTRAVENOUS; SOFT TISSUE at 09:48

## 2024-06-25 RX ADMIN — ROPIVACAINE HYDROCHLORIDE 20 ML: 5 INJECTION EPIDURAL; INFILTRATION; PERINEURAL at 08:21

## 2024-06-25 RX ADMIN — SODIUM CHLORIDE, POTASSIUM CHLORIDE, SODIUM LACTATE AND CALCIUM CHLORIDE 9 ML/HR: 600; 310; 30; 20 INJECTION, SOLUTION INTRAVENOUS at 07:55

## 2024-06-25 RX ADMIN — GLYCOPYRROLATE 0.2 MG: 0.2 INJECTION, SOLUTION INTRAMUSCULAR; INTRAVENOUS at 09:43

## 2024-06-25 RX ADMIN — FAMOTIDINE 20 MG: 10 INJECTION INTRAVENOUS at 07:56

## 2024-06-25 RX ADMIN — SUGAMMADEX 200 MG: 100 INJECTION, SOLUTION INTRAVENOUS at 11:37

## 2024-06-25 RX ADMIN — FENTANYL CITRATE 50 MCG: 50 INJECTION, SOLUTION INTRAMUSCULAR; INTRAVENOUS at 13:17

## 2024-06-25 RX ADMIN — ONDANSETRON 4 MG: 2 INJECTION INTRAMUSCULAR; INTRAVENOUS at 11:29

## 2024-06-25 RX ADMIN — PROPOFOL 60 MG: 10 INJECTION, EMULSION INTRAVENOUS at 11:27

## 2024-06-25 RX ADMIN — MIDAZOLAM 2 MG: 1 INJECTION INTRAMUSCULAR; INTRAVENOUS at 08:10

## 2024-06-25 RX ADMIN — DEXAMETHASONE SODIUM PHOSPHATE 4 MG: 4 INJECTION, SOLUTION INTRA-ARTICULAR; INTRALESIONAL; INTRAMUSCULAR; INTRAVENOUS; SOFT TISSUE at 08:21

## 2024-06-25 RX ADMIN — LIDOCAINE HYDROCHLORIDE 100 MG: 20 INJECTION, SOLUTION INFILTRATION; PERINEURAL at 09:43

## 2024-06-25 RX ADMIN — FENTANYL CITRATE 50 MCG: 50 INJECTION, SOLUTION INTRAMUSCULAR; INTRAVENOUS at 08:10

## 2024-06-25 NOTE — ANESTHESIA PROCEDURE NOTES
Airway  Urgency: elective    Date/Time: 6/25/2024 9:45 AM  Airway not difficult    General Information and Staff    Patient location during procedure: OR  Anesthesiologist: Joseph Peñaloza MD  CRNA/CAA: Mariely Cole CRNA    Indications and Patient Condition  Indications for airway management: airway protection    Preoxygenated: yes  Mask difficulty assessment: 1 - vent by mask    Final Airway Details  Final airway type: endotracheal airway      Successful airway: ETT  Cuffed: yes   Successful intubation technique: direct laryngoscopy  Facilitating devices/methods: intubating stylet  Endotracheal tube insertion site: oral  Blade: Taurus  Blade size: 3  ETT size (mm): 7.0  Cormack-Lehane Classification: grade I - full view of glottis  Placement verified by: chest auscultation and capnometry   Measured from: lips  ETT/EBT  to lips (cm): 21  Number of attempts at approach: 1  Assessment: lips, teeth, and gum same as pre-op and atraumatic intubation

## 2024-06-25 NOTE — DISCHARGE INSTRUCTIONS
What to expect after a Nerve Block    Nerve blocks administered to block pain affect many types of nerves, including those nerves that control movement, pain, and normal sensation. Following a nerve block, you may notice some bruising at the site where the block was given. You may experience sensations such as: numbness of the affected area or limb, tingling, heaviness (that is the limb feels heavy to you), weakness or inability to move the affected arm or leg, or a feeling as if your arm or leg has “fallen asleep.”     A nerve block can last from 2 to 36 hours depending on the medications used.  Usually the weakness wears off first followed by the tingling and heaviness. As the block wears off, you may begin to notice pain; however, this sequence of events may occur in any order. Typically, you will be able to move your limb before you will feel it. Once a nerve block begins to wear off, the effects are usually completely gone within 60 minutes.  If you experience continued side effects that you believe are block related for longer than 48 hours, please call your healthcare provider. Please see block-specific instructions below.    Instructions for any Block involving the leg/foot:   If you have had a leg /foot block, you should not bear weight on the affected leg until the block has worn off. After the block has worn off, weight bearing should be as directed by your surgeon. You may be sent home with crutches. You are at high risk for falling because of the anesthetic effects on your leg. Please use caution when standing or trying to move or walk. Have someone assist you until your leg and foot function have returned to normal.     Protection of a “blocked” limb  After a nerve block, you cannot feel pain, pressure, or extremes of temperature in the affected limb. And because of this, your blocked limb is at more risk for injury. For example, it is possible to burn your limb on an extremely hot surface without  feeling it.     When resting, it is important to reposition your limb periodically to avoid prolonged pressure on it. This may require the use of pillows and padding.    While sleeping, you should avoid rolling onto the affected limb or putting too much pressure on it.     If you have a cast or tight dressing, check the color of your fingers or toes of the affected limb. Call your surgeon if they look discolored (that is, dusky, dark colored).    Use caution in cold weather. Cover your limb appropriately to protect it from the cold.    Pain Management:  Your surgeon will give you a prescription for pain medication. Begin taking this before the nerve block wears off. Bear in mind that sometimes the block can wear off in the middle of the night.    Post Operative Foot/Ankle Surgery Orders/Instructions:        I. ACTIVITIES:  1. Exercises/Activities of Daily Living:  Strict NON-WEIGHTBEARING status.  Use crutches or walker for mobilization  Ice and elevate the operative extremity above level of heart  No tub baths, hot tubs, or swimming pools for 4 weeks  Your surgeon will discuss with you when you will be able to drive again.  Everyone that comes near you should wash their hands  Avoid sick people.    IV. INCISION CARE:  Do NOT remove the dressing  Keep dressing DRY  No creams or ointments to the incision  Check dressing every day and notify surgeon immediately:  If any significant drainage  Increase in swelling of the extremity  Increase in pain  Increase in overall body temperature (greater than 100.5 degrees)    V. Medications:  1. Stool Softeners: You will be at greater risk of constipation after surgery due to being less mobile and the pain medications.  Take stool softeners as instructed by your surgeon while on pain medications. Over the counter Colace 100 mg 1-2 capsules twice daily.  If stools become too loose or too frequent, please decreases the dosage or stop the stool softener.  If constipation occurs  despite use of stool softeners, you are to continue the stool softeners and add a laxative (Milk of Magnesia 1 ounce daily as needed)  Drink plenty of fluids, and eat fruits and vegetables during your recovery time      2. Antibiotics: Post-operative antibiotics are to begin today    3. Pain Medications utilized after surgery are narcotics and the law requires that the following information be given to all patients that are prescribed narcotics:  CLASSIFICATION: Pain medications are called Opioids and are narcotics  LEGALITIES: It is illegal to share narcotics with others and to drive within 24 hours of taking narcotics  POTENTIAL SIDE EFFECTS: Potential side effects of opioids include: nausea, vomiting, itching, dizziness, drowsiness, dry mouth, constipation, and difficulty urinating.  POTENTIAL ADVERSE EFFECTS:  Opioid tolerance can develop with use of pain medications and this simply means that it requires more and more of the medication to control pain; however, this is seen more in patients that use opioids for longer periods of time.  Opioid dependence can develop with use of Opioids and this simply means that to stop the medication can cause withdrawal symptoms; however, this is seen with patients that use Opioids for longer periods of time.  Opioid addiction can develop with use of Opioids and the incidence of this is very unlikely in patients who take the medications as ordered and stop the medications as instructed.  Opioid overdose can be dangerous, but is unlikely when the medication is taken as ordered and stopped when ordered. It is important not to mix opioids with alcohol or with and type of sedative such as Benadryl as this can lead to over sedation and respiratory difficulty.  DOSAGE:  Pain medications will need to be taken consistently for the first week to decrease pain and promote adequate pain relief and participation in physical therapy.  After the initial surgical pain begins to resolve, you  may begin to decrease the pain medication. By the end of 6 weeks, you should be off of pain medications.  Refills will not be given by the office during evening hours, on weekends, or after 6 weeks post-op.  To seek refills on pain medications during the initial 6 week post-operative period, you must call the office 48 hours in advance to request the refill. The office will then notify you when to  the prescription. DO NOT wait until you are out of the medication to request a refill.      V. FOLLOW-UP VISITS:  You will need to follow up in the office with your surgeon in 7-10 days. Please call this number [phone]  to schedule this appointment.  If you have any concerns or suspected complications prior to your follow up visit, please call your surgeons office. Do not wait until your appointment time if you suspect complications. These will need to be addressed in the office promptly.

## 2024-06-25 NOTE — PERIOPERATIVE NURSING NOTE
Pt sates she took her insulin pump off this morning around 0600. Did not bring the pump or any supplies with her to the hospital. Preop blood sugar is 176.

## 2024-06-25 NOTE — BRIEF OP NOTE
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]     * Rupture of right Achilles tendon, initial encounter [S86.011A]    Procedure/CPT® Codes:        Procedure(s):  Right Achilles tendon debridement and secondary repair with cadaver graft with tendon transfer to heal from great toe and removal of heel spurs              Surgeon(s):  Igor Cox MD    Anesthesia: General with Block    Staff:   Cell Saver : Alber Haynes  Circulator: Willa Whaley RN  Radiology Technologist: Ronal Moctezuma  Scrub Person: Roddy Lozano; Azul Garcia  Vendor Representative: Igor Soto         Estimated Blood Loss:  25 ml    Urine Voided: * No values recorded between 6/25/2024  9:39 AM and 6/25/2024 12:26 PM *    Specimens:                None    TT 67 min      Drains: * No LDAs found *    Findings: see dict        Complications: none          Igor Cox MD     Date: 6/25/2024  Time: 12:33 EDT

## 2024-06-25 NOTE — ANESTHESIA PROCEDURE NOTES
Peripheral Block    Pre-sedation assessment completed: 6/25/2024 8:15 AM    Patient reassessed immediately prior to procedure    Patient location during procedure: pre-op  Start time: 6/25/2024 8:15 AM  Stop time: 6/25/2024 8:21 AM  Reason for block: at surgeon's request and post-op pain management  Performed by  Anesthesiologist: Joseph Peñaloza MD  Preanesthetic Checklist  Completed: patient identified, IV checked, site marked, risks and benefits discussed, surgical consent, monitors and equipment checked, pre-op evaluation and timeout performed  Prep:  Pt Position: supine  Sterile barriers:cap, gloves, mask and sterile barriers  Prep: ChloraPrep  Patient monitoring: blood pressure monitoring, continuous pulse oximetry and EKG  Procedure    Sedation: yes  Performed under: local infiltration  Guidance:ultrasound guided    ULTRASOUND INTERPRETATION.  Using ultrasound guidance a 22 G gauge needle was placed in close proximity to the sciatic nerve, at which point, under ultrasound guidance anesthetic was injected in the area of the nerve and spread of the anesthesia was seen on ultrasound in close proximity thereto.  There were no abnormalities seen on ultrasound; a digital image was taken; and the patient tolerated the procedure with no complications. Images:still images obtained, printed/placed on chart (U/S used to localize the nerve)    Laterality:right  Block Type:popliteal and sciatic  Injection Technique:single-shot  Needle Type:echogenic  Needle Gauge:22 G  Resistance on Injection: less than 15 psi    Medications Used: dexamethasone (DECADRON) injection - Injection   4 mg - 6/25/2024 8:21:00 AM  ropivacaine (NAROPIN) 0.5 % injection - Injection   20 mL - 6/25/2024 8:21:00 AM  bupivacaine-EPINEPHrine PF (MARCAINE w/EPI) 0.25% -[id number] injection - Injection   10 mL - 6/25/2024 8:21:00 AM      Post Assessment  Injection Assessment: negative aspiration for heme, no paresthesia on injection and incremental  injection  Patient Tolerance:comfortable throughout block  Complications:no  Additional Notes     Performed by: Joseph Peñaloza MD

## 2024-06-25 NOTE — ANESTHESIA PREPROCEDURE EVALUATION
Anesthesia Evaluation     NPO Solid Status: > 8 hours             Airway   Mallampati: II  TM distance: >3 FB  Neck ROM: full  no difficulty expected  Dental - normal exam     Pulmonary - normal exam   (+) ,sleep apnea  Cardiovascular - normal exam    (+) hypertension      Neuro/Psych  (+) headaches, psychiatric history  GI/Hepatic/Renal/Endo    (+) morbid obesity, diabetes mellitus    Musculoskeletal     Abdominal    Substance History      OB/GYN          Other                    Anesthesia Plan    ASA 3     general with block     (-------------------------              06/25/24                    0735        -------------------------   BP:         156/82        Pulse:        80          Resp:         18          Temp:   36.7 °C (98 °F)   SpO2:         94%        -------------------------    Ozempic- last dose 6/17/24)  intravenous induction     Anesthetic plan, risks, benefits, and alternatives have been provided, discussed and informed consent has been obtained with: patient.    CODE STATUS:

## 2024-06-26 NOTE — OP NOTE
Operative Note      Facility: [facility]  Patient Name: Marilin Leone  YOB: 1970  Date: 6/25/2024  Medical Record Number: [id number]      Pre-op Diagnosis: 1.  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  Right calcaneal Duyen deformity      Post-op Diagnosis:  1.  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  Right calcaneal Duyen deformity         Procedure(s):  1.  Right Achilles debridement and secondary reconstruction using cadaver semitendinosus allograft with Bio-Tenodesis screws measuring 4.75 x 15 mm  2.  Right resection of calcaneal Duyen deformity  3.  Right FHL tendon transfer to calcaneus using Arthrex Bio-Tenodesis screw measuring 6.25 mm x 15 mm    Surgeon(s):  Igor Cox MD    Anesthesia: General with Block  Anesthesiologist: Joseph Peñaloza MD  CRNA: Usha Cormier CRNA; Mariely Cole CRNA    Staff:   Cell Saver : Alber Haynes  Circulator: Willa Whaley RN  Radiology Technologist: Ronal Moctezuma  Scrub Person: Roddy Lozano; Azul Garcia  Vendor Representative: Igor Soto  Assistants : none      Tourniquet time: 67 minutes        Estimated Blood Loss:   25 ml  Drains: None  Specimens: * No orders in the log *  Findings: See Dictation    Complications: None      Indications for Procedure: Patient sustained injury to her right Achilles on 5/22/2024 when she stepped into a hole in the grass feeling a pop.  She was subsequently found to have chronic insertional calcific Achilles tendinosis with Duyen deformity with subsequent complete rupture of the Achilles.  She was understanding of operative and nonoperative treatment options with mutual decision made to proceed with operative treatment with clear understanding of risk benefits potential outcomes and complications.          Description of Procedure: Preoperative informed  consent and anesthesia evaluation were obtained.  IV antibiotics were administered and the surgical site was marked.  Block was administered by anesthesia.  Patient was brought to the operating room and placed in a supine position.  Anesthesia was induced and patient was intubated.  Well-padded tourniquet was placed right proximal thigh.  Patient was then placed in a prone position and all soft tissues and bony prominences were well-padded and impinged.  Care was taken to note the proper resting length of the contralateral leg.    Right leg was prepped and draped in a sterile fashion and surgical timeout was performed.    Right leg was exsanguinated and pneumatic tourniquet inflated to 250 mmHg.  X-rays were used to delineate the area of bony prominence posteriorly.  Incision was made proximal to the area of fullness noted at the distal extent of the Achilles rupture and carried distally to the anterior tip of the heel.  Full-thickness flaps were carefully elevated.  Patient had significant thickened peritenon was divided and scarred down.  This was meticulously elevated off of the Achilles which was found of a full-thickness tear with about 6 m of retraction with intervening scar tissue and poor quality distal tendon.  Full-thickness flaps were elevated around this in the heel as well.  I was able to mobilize the torn tendon which was quite stenotic and tendinopathic over the distal area and was resected back to somewhat more normal-appearing tendon in a beveled fashion.  I used a scalpel to remove scar tissue down onto the superior calcaneal tuberosity.  There was a large spur over the posterior aspect of the calcaneal tuberosity and prominent Duyen deformity.  Rongeur and rasp were used to remove the posterior spurring and resect the Duyen deformity such that there was a nice hemispherical footprint.  Her bone was somewhat soft.  I then digitally mobilized the Achilles tendon and placed maximum plantarflexion  at the ankle and could not achieve adequate length to repair this.  I felt it was best to proceed with FHL tendon transfer as well as allograft reconstruction of the Achilles rather than attempting lengthening procedures that I did not feel would be adequate with gastroc recession and when to minimize complications and weakness by avoiding the medial transfer which would be more extensive.    Wound was thoroughly irrigated.  I divided the anterior fascia exposing the FHL muscle belly.  The tendon was then exposed in the fibro-osseous tunnel with care taken to protect neurovascular structures and then flexed the great toe and ankle maximally and resected the tendon deep within its tunnel.  This was then whipstitched and measured and found to be 6.5 mm.  I then identified the isometric attachment point and confirmed this radiographically and confirmed adequate resection of the posterior calcaneus.  I then passed the pin and then subsequently reamed over this with a 7 mm reamer.  Tunnel was sprayed with PRP and tendon was passed tensioning such that this matched the contralateral limb.  This was secured with a 15 mm x 6.25 mm Bio-Tenodesis screw.    I then set about the allograft reconstruction.  The semitendinosus allograft was prepared on the back table and measured about 4-1/2 mm.  I created a tunnel distal and lateral to the FHL tendon transfer after guidewire placement and radiographic confirmation.  This was then reamed with a 5 mm reamer.  Tension was passed and secured with a 4.75 x 15 mm Bio-Tenodesis screw.  I then moved the tendon allograft through the Achilles tendon stump and tensioned appropriately/symptomatic resting length.  This was then secured along both margins with #2 FiberWire.  They will maintain proper tension and secured the second limb of the reconstruction into a similar bone tunnel offset from the FHL tendon transfer.  Reconstruction was adequate and robust and appeared to be adequately  tensioned.  I then reinforced this with multiple #2 FiberWire sutures along the course of the Achilles allograft and approximated the graft limbs together and oversew this to the FHL muscle belly.  Wound was thoroughly irrigated and tourniquet was released.  Hemostasis was obtained.  This was then sprayed with PRP as had the allograft tunnels been prior to insertion.  Peritenon was then repaired with 2-0 Vicryl and 3-0 Vicryl suture.  Skin was closed with 3-0 Vicryl subcutaneous tissues and 3-0 nylon sutures with good wound edge approximation without blanching.  Sterile dressing was applied and patient was placed into a very well-padded posterior splint in plantarflexion with heel relief.  Patient was awakened transferred to stretcher and taken recovery in stable condition

## 2024-06-26 NOTE — ANESTHESIA POSTPROCEDURE EVALUATION
Patient: Marilin Leone    Procedure Summary       Date: 06/25/24 Room / Location: [facility] OR 09 / [facility] MAIN OR    Anesthesia Start: 0939 Anesthesia Stop: 1232    Procedure: Right Achilles tendon debridement and secondary repair with cadaver graft with tendon transfer to heal from great toe and removal of heel spurs (Right: Ankle) Diagnosis:       Duyen's deformity of right heel      Calcific Achilles tendonitis      Rupture of right Achilles tendon, initial encounter      (Duyen's deformity of right heel [M92.61])      (Calcific Achilles tendonitis [M65.28])      (Rupture of right Achilles tendon, initial encounter [S86.011A])    Surgeons: Igor Cox MD Provider: Joseph Peñaloza MD    Anesthesia Type: general with block ASA Status: 3            Anesthesia Type: general with block    Vitals  Vitals Value Taken Time   /65 06/25/24 1345   Temp 36.6 °C (97.9 °F) 06/25/24 1228   Pulse 87 06/25/24 1353   Resp 20 06/25/24 1345   SpO2 97 % 06/25/24 1353   Vitals shown include unfiled device data.        Post Anesthesia Care and Evaluation    Anesthetic complications: No anesthetic complications

## 2024-07-10 ENCOUNTER — OFFICE VISIT (OUTPATIENT)
Dept: ORTHOPEDIC SURGERY | Facility: CLINIC | Age: 54
End: 2024-07-10
Payer: COMMERCIAL

## 2024-07-10 ENCOUNTER — TELEPHONE (OUTPATIENT)
Dept: ORTHOPEDIC SURGERY | Facility: CLINIC | Age: 54
End: 2024-07-10

## 2024-07-10 VITALS — TEMPERATURE: 98.6 F | HEIGHT: 62 IN | BODY MASS INDEX: 53 KG/M2 | WEIGHT: 288 LBS

## 2024-07-10 DIAGNOSIS — M65.28 CALCIFIC ACHILLES TENDONITIS: ICD-10-CM

## 2024-07-10 DIAGNOSIS — M92.61 HAGLUND'S DEFORMITY OF RIGHT HEEL: ICD-10-CM

## 2024-07-10 DIAGNOSIS — R52 PAIN: Primary | ICD-10-CM

## 2024-07-10 DIAGNOSIS — S86.011A RUPTURE OF RIGHT ACHILLES TENDON, INITIAL ENCOUNTER: ICD-10-CM

## 2024-07-10 RX ORDER — OXYCODONE HYDROCHLORIDE AND ACETAMINOPHEN 5; 325 MG/1; MG/1
1 TABLET ORAL EVERY 12 HOURS PRN
Qty: 20 TABLET | Refills: 0 | Status: SHIPPED | OUTPATIENT
Start: 2024-07-10 | End: 2024-07-10 | Stop reason: SDUPTHER

## 2024-07-10 RX ORDER — OXYCODONE HYDROCHLORIDE AND ACETAMINOPHEN 5; 325 MG/1; MG/1
1 TABLET ORAL EVERY 12 HOURS PRN
Qty: 20 TABLET | Refills: 0 | Status: SHIPPED | OUTPATIENT
Start: 2024-07-10

## 2024-07-10 NOTE — TELEPHONE ENCOUNTER
Completed PA for: oxyCODONE-acetaminophen (PERCOCET) 5-325 MG per tablet     Key: [id number]    Reinaldo is reviewing your PA request. You may close this dialog, return to your dashboard, and perform other tasks.    To check for an update later, open this request again from your dashboard. If [employer] has not replied within 24 hours for urgent requests or within 48 hours for standard requests, please contact [employer] at [phone].

## 2024-07-10 NOTE — PROGRESS NOTES
Ankle Follow Up      Patient: Marilin Leone    YOB: 1970 54 y.o. female    Chief Complaints: Ankle sore but doing okay    History of Present Illness: Patient injured her right Achilles on 5/22/2024 when she stepped into a hole at her grandsons [employer] in the grass.  She felt and heard a pop.  She was seen at [facility] with x-rays that did not show any obvious fractures.  She subsequently saw Mary Grace on 5/30/2024 with findings of suspicion for Achilles injury.  She was fitted with a short boot at that time and recommended use of a walker and sent for MRI     Patient was seen on 6/13/2024 with moderate pain in the posterior aspect of her right hindfoot with history as outlined above without previous history of injury.  She is did not currently work and was living with her mother.      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  She underwent right FHL tendon transfer as well as debridement and secondary repair of her right Achilles using semitendinosus allograft with resection of the Duyen deformity and posterior calcifications on 6/25/2024.  She was discharged home the same day with instructions for elevation and nonweightbearing.    Patient is seen back today stating that she has had some intermittent discomfort but overall is doing well.  She has put some weight on her foot balance getting into the bathroom and also had an episode last week when she fell getting out of a car and did put some weight on her foot.  She is down to taking pain medication only 1 maybe 2/day.  HPI    ROS: ankle pain  Past Medical History:   Diagnosis Date    Anxiety and depression     Arthritis     Bipolar 1 disorder     Diabetes     GERD (gastroesophageal reflux disease)     HBP (high blood pressure)     History of Bell's palsy     History of kidney stones     IBS (irritable bowel syndrome)     Migraine     Peripheral neuropathy      "legs and arms    Poor peripheral circulation     Sleep apnea     no machine    Torn Achilles tendon        Physical Exam:   Vitals:    07/10/24 1039   Temp: 98.6 °F (37 °C)   TempSrc: Temporal   Weight: 131 kg (288 lb)   Height: 157.5 cm (62\")   PainSc:   5   PainLoc: Ankle     Well developed with normal mood.  On exam she is accompanied.  There is no sign of infection to her wound.  There does appear to be some ecchymotic area with probable superficial epidermolysis no sign of infection or clear full-thickness breakdown at this point.  Calf is nontender without sign of DVT.  There was altered sensation in the dorsum of the foot but grossly intact in the toes and some diminished sensation about the heel but grossly intact no sign of RSD.          Radiology: Lateral view of the right heel ordered evaluate postoperative alignment reviewed and compared to previous x-rays.  There has been adequate resection of the prominent superior calcaneal tuberosity and posterior spurs.  Bone tunnels are evident without evidence of fracture.      MRI films and report of the right ankle dated 6/10/2024 reviewed which shows full-thickness or high-grade near full-thickness tear/avulsion of the distal Achilles from the posterior calcaneus with proximal retraction with only a few thin far posterior tendon fibers tendon remaining intact.  There is also Duyen deformity with posterior superior calcaneus and posterior spurring.  There is a small to moderate tibiotalar and small posterior subtalar fusion felt to be posttraumatic.  There is patchy marrow edema at the anterior distal tibial plafond and dorsal talar head and neck likely representing bone contusions.     Assessment/Plan: Status post right Achilles surgery as outlined above     1.  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  Right tibiotalar and " posterior subtalar joint effusions with marrow edema at the distal plafond and dorsal talar head and neck thought to represent bone contusions and posttraumatic effusion    Reviewed treatment going forward with her and will let the wound continue declaring itself but do not see any major sign of complication at this time.    Sutures removed and Steri-Strips were applied.  Wound was cleaned and sterile dressing applied.  She was placed in a very well-padded short leg nonweightbearing fiberglass cast in about 10 degrees of plantarflexion.  She will continue elevation avoiding pressure on the back of her heel and keep weight off of this and continue aspirin for DVT prophylaxis    Did refill her Percocet 1 p.o. every 12 hours as needed for pain and discussed careful use of narcotics with addiction potential with which she voiced clear understanding.    Will see her back in 2 weeks no x-rays.

## 2024-07-25 ENCOUNTER — OFFICE VISIT (OUTPATIENT)
Dept: ORTHOPEDIC SURGERY | Facility: CLINIC | Age: 54
End: 2024-07-25
Payer: COMMERCIAL

## 2024-07-25 VITALS — WEIGHT: 288 LBS | TEMPERATURE: 97.3 F | HEIGHT: 62 IN | BODY MASS INDEX: 53 KG/M2

## 2024-07-25 DIAGNOSIS — S86.011A RUPTURE OF RIGHT ACHILLES TENDON, INITIAL ENCOUNTER: ICD-10-CM

## 2024-07-25 DIAGNOSIS — M92.61 HAGLUND'S DEFORMITY OF RIGHT HEEL: Primary | ICD-10-CM

## 2024-07-25 DIAGNOSIS — T81.31XD POSTOPERATIVE WOUND DEHISCENCE, SUBSEQUENT ENCOUNTER: ICD-10-CM

## 2024-07-25 DIAGNOSIS — M65.28 CALCIFIC ACHILLES TENDONITIS: ICD-10-CM

## 2024-07-25 PROCEDURE — 99024 POSTOP FOLLOW-UP VISIT: CPT | Performed by: ORTHOPAEDIC SURGERY

## 2024-07-25 RX ORDER — OLANZAPINE 5 MG/1
5 TABLET ORAL NIGHTLY
COMMUNITY
Start: 2024-07-16

## 2024-07-25 NOTE — PROGRESS NOTES
Ankle Follow Up      Patient: Marilin Leone    YOB: 1970 54 y.o. female    Chief Complaints: Ankle burns    History of Present Illness:Patient injured her right Achilles on 5/22/2024 when she stepped into a hole at her grands[employer] in the grass.  She felt and heard a pop.  She was seen at [facility] with x-rays that did not show any obvious fractures.  She subsequently saw Mary Grace on 5/30/2024 with findings of suspicion for Achilles injury.  She was fitted with a short boot at that time and recommended use of a walker and sent for MRI     Patient was seen on 6/13/2024 with moderate pain in the posterior aspect of her right hindfoot with history as outlined above without previous history of injury.  She is did not currently work and was living with her mother.       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  She underwent right FHL tendon transfer as well as debridement and secondary repair of her right Achilles using semitendinosus allograft with resection of the Duyen deformity and posterior calcifications on 6/25/2024.  She was discharged home the same day with instructions for elevation and nonweightbearing.     Patient was seen on 710 stating that she has had some intermittent discomfort but overall is doing well.  She had put some weight on her foot to balance balance getting into the bathroom and also had an episode the previous week when she fell getting out of a car and did put some weight on her foot.  She was down to taking pain medication only 1 maybe 2/day.    Her wound was inspected that time and had some area of questionable superficial epidermolysis but no sign of infection or gross dehiscence.  Sutures removed and Steri-Strips were applied.  She is placed in a very well-padded short leg cast in plantarflexion with instructions for elevation and nonweightbearing and we would let the wound  "declare itself.    Patient is seen back today stating that she has had increased stinging pain since she had her sutures taken out but not sure if it is from her surgery or from her neuropathy.  She has been nonweightbearing but has occasionally put some weight on this to balance.  HPI    ROS: ankle pain  Past Medical History:   Diagnosis Date    Anxiety and depression     Arthritis     Bipolar 1 disorder     Diabetes     GERD (gastroesophageal reflux disease)     HBP (high blood pressure)     History of Bell's palsy     History of kidney stones     IBS (irritable bowel syndrome)     Migraine     Peripheral neuropathy     legs and arms    Poor peripheral circulation     Sleep apnea     no machine    Torn Achilles tendon        Physical Exam:   Vitals:    07/25/24 0956   Temp: 97.3 °F (36.3 °C)   Weight: 131 kg (288 lb)   Height: 157.5 cm (62\")   PainSc:   3     Well developed with normal mood.  On exam her hindfoot incision is without sign of infection.  Steri-Strips removed and there was some epidermolysis this area was debrided I did not see any sign of any full-thickness breakdown or dehiscence.  She had diminished sensation in her heel but had similar finding on the contralateral side as well as chronic diminished sensation in the foot.          Radiology:    MRI films and report of the right ankle dated 6/10/2024 reviewed which shows full-thickness or high-grade near full-thickness tear/avulsion of the distal Achilles from the posterior calcaneus with proximal retraction with only a few thin far posterior tendon fibers tendon remaining intact.  There is also Duyen deformity with posterior superior calcaneus and posterior spurring.  There is a small to moderate tibiotalar and small posterior subtalar fusion felt to be posttraumatic.  There is patchy marrow edema at the anterior distal tibial plafond and dorsal talar head and neck likely representing bone contusions.     Assessment/Plan: Status post right " Achilles surgery as outlined above      1.  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  Right tibiotalar and posterior subtalar joint effusions with marrow edema at the distal plafond and dorsal talar head and neck thought to represent bone contusions and posttraumatic effusion    We discussed treatment going forward I do not see any sign of major wound complication and will let this continue declaring itself.    Will leave her out of the cast so that she can do wound care with Betadine dressings twice a day for at least a week and then once a day and keep a clean dressing on this and Ace wrap's.  Boot will have her use her boot with double heel lift..  She did not have her boot with her today.  She did put some weight on the foot with some increased pain when she slipped some trying to get into her wheelchair but overall seem to be doing okay.    She will avoid pressure on the back of her heel and she is still taking some pain medication 1 or 2 a day but mostly at night and did not request any refills at this time.    Will see her back in about 10 days no x-rays.

## 2024-08-07 ENCOUNTER — OFFICE VISIT (OUTPATIENT)
Dept: ORTHOPEDIC SURGERY | Facility: CLINIC | Age: 54
End: 2024-08-07
Payer: COMMERCIAL

## 2024-08-07 VITALS — WEIGHT: 288 LBS | BODY MASS INDEX: 53 KG/M2 | HEIGHT: 62 IN | TEMPERATURE: 97.5 F

## 2024-08-07 DIAGNOSIS — T81.31XD POSTOPERATIVE WOUND DEHISCENCE, SUBSEQUENT ENCOUNTER: ICD-10-CM

## 2024-08-07 DIAGNOSIS — M92.61 HAGLUND'S DEFORMITY OF RIGHT HEEL: ICD-10-CM

## 2024-08-07 DIAGNOSIS — M65.28 CALCIFIC ACHILLES TENDONITIS: ICD-10-CM

## 2024-08-07 DIAGNOSIS — S86.011A RUPTURE OF RIGHT ACHILLES TENDON, INITIAL ENCOUNTER: Primary | ICD-10-CM

## 2024-08-07 PROCEDURE — 99024 POSTOP FOLLOW-UP VISIT: CPT | Performed by: ORTHOPAEDIC SURGERY

## 2024-08-07 NOTE — PROGRESS NOTES
Ankle Follow Up      Patient: Marilin Leone    YOB: 1970 54 y.o. female    Chief Complaints: Ankle hurts and foot tingles    History of Present Illness:Patient injured her right Achilles on 5/22/2024 when she stepped into a hole at her [employer]ands[employer] in the grass.  She felt and heard a pop.  She was seen at [facility] with x-rays that did not show any obvious fractures.  She subsequently saw Mary Grace on 5/30/2024 with findings of suspicion for Achilles injury.  She was fitted with a short boot at that time and recommended use of a walker and sent for MRI     Patient was seen on 6/13/2024 with moderate pain in the posterior aspect of her right hindfoot with history as outlined above without previous history of injury.  She is did not currently work and was living with her mother.       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  She underwent right FHL tendon transfer as well as debridement and secondary repair of her right Achilles using semitendinosus allograft with resection of the Duyen deformity and posterior calcifications on 6/25/2024.  She was discharged home the same day with instructions for elevation and nonweightbearing.     Patient was seen on 7/10/2024 stating that she has had some intermittent discomfort but overall is doing well.  She had put some weight on her foot to balance balance getting into the bathroom and also had an episode the previous week when she fell getting out of a car and did put some weight on her foot.  She was down to taking pain medication only 1 maybe 2/day.     Her wound was inspected that time and had some area of questionable superficial epidermolysis but no sign of infection or gross dehiscence.  Sutures removed and Steri-Strips were applied.  She is placed in a very well-padded short leg cast in plantarflexion with instructions for elevation and nonweightbearing and we  Patient is alert and oriented x 4, denies pain and shortness of breath. Patient is resting in bed. Patient bed is locked, in the lowest position, safety precaution is in place, and call light is in reach. Will continue to monitor patient's plan of care. "would let the wound declare itself.     Patient was seen on 7/25/2024 stating that she had had had increased stinging pain since she had her sutures taken out but was not sure if it is from her surgery or from her neuropathy.  She had been nonweightbearing but had occasionally put some weight on this to balance.    At that time her wound showed some areas of superficial breakdown as previously noted but no sign of full-thickness injury nor infection.  She was instructed on wound care as well as to continue with elevation and nonweightbearing and avoiding pressure on the back of her heel.    Patient is seen back today stating that she still has quite a bit of tingling in her hindfoot into her foot.  They have been doing wound care and has not had any drainage.  She has been putting some weight on it with her walker and boot with heel lifts  HPI    ROS: ankle pain  Past Medical History:   Diagnosis Date    Anxiety and depression     Arthritis     Bipolar 1 disorder     Diabetes     GERD (gastroesophageal reflux disease)     HBP (high blood pressure)     History of Bell's palsy     History of kidney stones     IBS (irritable bowel syndrome)     Migraine     Peripheral neuropathy     legs and arms    Poor peripheral circulation     Sleep apnea     no machine    Torn Achilles tendon        Physical Exam:   Vitals:    08/07/24 1041   Temp: 97.5 °F (36.4 °C)   Weight: 131 kg (288 lb)   Height: 157.5 cm (62\")   PainSc:   9     Well developed with normal mood.  On exam her foot incision does not show sign of infection there was some thickened areas of eschar that lightly removed the loose edges of them trimmed and removed the more proximal eschar and there was no sign of full-thickness breakdown.  There is no sign of infection.  She had tingling in her foot but grossly sensate to light touch and no hyperesthesia.                Radiology:    MRI films and report of the right ankle dated 6/10/2024 reviewed which shows " full-thickness or high-grade near full-thickness tear/avulsion of the distal Achilles from the posterior calcaneus with proximal retraction with only a few thin far posterior tendon fibers tendon remaining intact.  There is also Duyen deformity with posterior superior calcaneus and posterior spurring.  There is a small to moderate tibiotalar and small posterior subtalar fusion felt to be posttraumatic.  There is patchy marrow edema at the anterior distal tibial plafond and dorsal talar head and neck likely representing bone contusions.     Assessment/Plan: Status post right Achilles surgery as outlined above      1.  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  Right tibiotalar and posterior subtalar joint effusions with marrow edema at the distal plafond and dorsal talar head and neck thought to represent bone contusions and posttraumatic effusion.      We discussed treatment going forward and she will continue with wound care and may progress with partial weightbearing with her boot and walker with heel lifts.    Reviewed her that the tingling sensation should improve over time if not not expect any functional deficit.  She is already on Lyrica    Will order some therapy but will try to do home health therapy as she is not able to get out on her own and has limited help.  She was provided with a copy of the Achilles postop protocol    Anything worsens with the wound she will let me know otherwise I will see her back in 2 to 3 weeks no x-rays.

## 2024-08-08 ENCOUNTER — HOME HEALTH ADMISSION (OUTPATIENT)
Dept: HOME HEALTH SERVICES | Facility: HOME HEALTHCARE | Age: 54
End: 2024-08-08
Payer: COMMERCIAL

## 2024-08-28 ENCOUNTER — OFFICE VISIT (OUTPATIENT)
Dept: ORTHOPEDIC SURGERY | Facility: CLINIC | Age: 54
End: 2024-08-28
Payer: COMMERCIAL

## 2024-08-28 VITALS — TEMPERATURE: 97.5 F | BODY MASS INDEX: 53 KG/M2 | WEIGHT: 288 LBS | HEIGHT: 62 IN

## 2024-08-28 DIAGNOSIS — M92.61 HAGLUND'S DEFORMITY OF RIGHT HEEL: Primary | ICD-10-CM

## 2024-08-28 DIAGNOSIS — S86.011A RUPTURE OF RIGHT ACHILLES TENDON, INITIAL ENCOUNTER: ICD-10-CM

## 2024-08-28 DIAGNOSIS — M65.28 CALCIFIC ACHILLES TENDONITIS: ICD-10-CM

## 2024-08-28 DIAGNOSIS — T81.31XD POSTOPERATIVE WOUND DEHISCENCE, SUBSEQUENT ENCOUNTER: ICD-10-CM

## 2024-08-28 PROCEDURE — 99024 POSTOP FOLLOW-UP VISIT: CPT | Performed by: ORTHOPAEDIC SURGERY

## 2024-08-28 RX ORDER — DULOXETIN HYDROCHLORIDE 30 MG/1
1 CAPSULE, DELAYED RELEASE ORAL DAILY
COMMUNITY
Start: 2024-08-20

## 2024-08-28 NOTE — PROGRESS NOTES
Ankle Follow Up      Patient: Marilin Leone    YOB: 1970 54 y.o. female    Chief Complaints: Ankle feeling better but but foot still feels like it burns    History of Present Illness:Patient injured her right Achilles on 5/22/2024 when she stepped into a hole at her [employer] [place] the [place].  She felt and heard a pop.  She was seen at [facility] with x-rays that did not show any obvious fractures.  She subsequently saw Mary Grace on 5/30/2024 with findings of suspicion for Achilles injury.  She was fitted with a short boot at that time and recommended use of a walker and sent for MRI     Patient was seen on 6/13/2024 with moderate pain in the posterior aspect of her right hindfoot with history as outlined above without previous history of injury.  She is did not currently work and was living with her mother.       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  She underwent right FHL tendon transfer as well as debridement and secondary repair of her right Achilles using semitendinosus allograft with resection of the Duyen deformity and posterior calcifications on 6/25/2024.  She was discharged home the same day with instructions for elevation and nonweightbearing.     Patient was seen on 7/10/2024 stating that she has had some intermittent discomfort but overall is doing well.  She had put some weight on her foot to balance balance getting into the bathroom and also had an episode the previous week when she fell getting out of a car and did put some weight on her foot.  She was down to taking pain medication only 1 maybe 2/day.     Her wound was inspected that time and had some area of questionable superficial epidermolysis but no sign of infection or gross dehiscence.  Sutures removed and Steri-Strips were applied.  She is placed in a very well-padded short leg cast in plantarflexion with instructions for  elevation and nonweightbearing and we would let the wound declare itself.     Patient was seen on 7/25/2024 stating that she had had had increased stinging pain since she had her sutures taken out but was not sure if it is from her surgery or from her neuropathy.  She had been nonweightbearing but had occasionally put some weight on this to balance.     At that time her wound showed some areas of superficial breakdown as previously noted but no sign of full-thickness injury nor infection.  She was instructed on wound care as well as to continue with elevation and nonweightbearing and avoiding pressure on the back of her heel.     Patient was seen on 8/7/2024 that she still had quite a bit of tingling in her hindfoot into her foot.  She had been doing wound care and had not had any drainage.  She had been putting some weight on it with her walker and boot with heel lifts.    At that time I did not see any gross sign of infection and there was some areas of eschar that were debrided.  She was instructed on continuing wound care and allowed to progress to partial weightbearing with her boot and walker with heel lifts.  A tingling sensation reviewed should improve over time but it also had some preoperative neuropathy but did not expect any functional deficit.  She was already on Lyrica.  She was referred to therapy including home therapy as she was not able to get out on her own.    Patient is seen back today stating that her Achilles pain is improving but still feels like hot needlelike pain in her foot similar to what she had preoperatively but somewhat exacerbated compared with preoperative symptoms.  She has been doing therapy and doing weightbearing on her boot with 2 heel lifts in her walker.  She is continued wound care and has not had any drainage  HPI    ROS: ankle pain  Past Medical History:   Diagnosis Date    Anxiety and depression     Arthritis     Arthritis of back     Bipolar 1 disorder     Diabetes      "GERD (gastroesophageal reflux disease)     HBP (high blood pressure)     History of Bell's palsy     History of kidney stones     IBS (irritable bowel syndrome)     Migraine     Peripheral neuropathy     legs and arms    Poor peripheral circulation     Sleep apnea     no machine    Torn Achilles tendon        Physical Exam:   Vitals:    08/28/24 1053   Temp: 97.5 °F (36.4 °C)   Weight: 131 kg (288 lb)   Height: 157.5 cm (62\")   PainSc:   4     Well developed with normal mood.  On exam her incision continues to heal there was some superficial areas of loose scab that were removed and no sign of any infection or deep wound problem she had good plantarflexion strength.  She was sensate on the plantar aspect of the foot and there was no hyperesthesia but some tingling sensations with burning in the foot.                Radiology:    MRI films and report of the right ankle dated 6/10/2024 reviewed which shows full-thickness or high-grade near full-thickness tear/avulsion of the distal Achilles from the posterior calcaneus with proximal retraction with only a few thin far posterior tendon fibers tendon remaining intact.  There is also Duyen deformity with posterior superior calcaneus and posterior spurring.  There is a small to moderate tibiotalar and small posterior subtalar fusion felt to be posttraumatic.  There is patchy marrow edema at the anterior distal tibial plafond and dorsal talar head and neck likely representing bone contusions.     Assessment/Plan: Status post right Achilles surgery as outlined above      1.  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  Right tibiotalar and posterior subtalar joint effusions with marrow edema at the distal plafond and dorsal talar head and neck thought to represent bone contusions and posttraumatic effusion.      We discussed treatment going " forward and thankfully do not see any sign of major wound problem or any infection.  She will continue with wound care and therapy.    Reviewed the neuritic symptoms should settle down over time after being exacerbated some preoperative level and she is already on Lyrica.    However continue weightbearing with her boot and may remove 1 heel lift each week over the next 2 weeks and continue with boot for another week thereafter still with her walker.  She is doing well at that point and then try going in 3 weeks with an athletic shoe as long as her wound is healed with walker and also continue with therapy.    Anything worsens she will let me know otherwise I will see her back in 4 weeks no x-rays.

## 2024-09-25 ENCOUNTER — OFFICE VISIT (OUTPATIENT)
Dept: ORTHOPEDIC SURGERY | Facility: CLINIC | Age: 54
End: 2024-09-25
Payer: COMMERCIAL

## 2024-09-25 VITALS — HEIGHT: 62 IN | BODY MASS INDEX: 51.27 KG/M2 | WEIGHT: 278.6 LBS | TEMPERATURE: 96.8 F

## 2024-09-25 DIAGNOSIS — M65.28 CALCIFIC ACHILLES TENDONITIS: ICD-10-CM

## 2024-09-25 DIAGNOSIS — T81.31XD POSTOPERATIVE WOUND DEHISCENCE, SUBSEQUENT ENCOUNTER: ICD-10-CM

## 2024-09-25 DIAGNOSIS — M92.61 HAGLUND'S DEFORMITY OF RIGHT HEEL: Primary | ICD-10-CM

## 2024-09-25 DIAGNOSIS — S86.011A RUPTURE OF RIGHT ACHILLES TENDON, INITIAL ENCOUNTER: ICD-10-CM

## 2024-10-17 ENCOUNTER — TELEPHONE (OUTPATIENT)
Dept: ORTHOPEDIC SURGERY | Facility: CLINIC | Age: 54
End: 2024-10-17
Payer: COMMERCIAL

## 2024-10-17 DIAGNOSIS — S86.001A ACHILLES TENDON INJURY, RIGHT, INITIAL ENCOUNTER: ICD-10-CM

## 2024-10-17 DIAGNOSIS — R52 PAIN: ICD-10-CM

## 2024-10-17 DIAGNOSIS — S86.011A RUPTURE OF RIGHT ACHILLES TENDON, INITIAL ENCOUNTER: ICD-10-CM

## 2024-10-17 DIAGNOSIS — M65.28 CALCIFIC ACHILLES TENDONITIS: ICD-10-CM

## 2024-10-17 DIAGNOSIS — T81.31XD POSTOPERATIVE WOUND DEHISCENCE, SUBSEQUENT ENCOUNTER: ICD-10-CM

## 2024-10-17 DIAGNOSIS — M92.61 HAGLUND'S DEFORMITY OF RIGHT HEEL: Primary | ICD-10-CM

## 2024-10-17 DIAGNOSIS — M25.571 ACUTE RIGHT ANKLE PAIN: ICD-10-CM

## 2024-10-17 NOTE — TELEPHONE ENCOUNTER
Caller: Marilin Leone    Relationship: Self    Best call back number: [phone]    What orders are you requesting (i.e. lab or imaging): PHYSICAL THERAPY RIGHT FOOT/ANKLE     In what timeframe would the patient need to come in: ASAP     Where will you receive your lab/imaging services: [facility]      Additional notes: PLEASE FAX ORDER -[phone] - [phone] [id number]

## 2024-10-30 ENCOUNTER — OFFICE VISIT (OUTPATIENT)
Dept: ORTHOPEDIC SURGERY | Facility: CLINIC | Age: 54
End: 2024-10-30
Payer: COMMERCIAL

## 2024-10-30 VITALS — BODY MASS INDEX: 51.16 KG/M2 | TEMPERATURE: 97.8 F | HEIGHT: 62 IN | WEIGHT: 278 LBS

## 2024-10-30 DIAGNOSIS — M92.61 HAGLUND'S DEFORMITY OF RIGHT HEEL: Primary | ICD-10-CM

## 2024-10-30 PROCEDURE — 99213 OFFICE O/P EST LOW 20 MIN: CPT | Performed by: ORTHOPAEDIC SURGERY

## 2024-10-30 NOTE — PROGRESS NOTES
Ankle Follow Up      Patient: Marilin Leone    YOB: 1970 54 y.o. female    Chief Complaints: Ankle doing okay    History of Present Illness:Patient injured her right Achilles on 5/22/2024 when she stepped into a hole at her [employer] [place].  She felt and heard a pop.  She was seen at [facility] with x-rays that did not show any obvious fractures.  She subsequently saw Mary Grace on 5/30/2024 with findings of suspicion for Achilles injury.  She was fitted with a short boot at that time and recommended use of a walker and sent for MRI     Patient was seen on 6/13/2024 with moderate pain in the posterior aspect of her right hindfoot with history as outlined above without previous history of injury.  She is did not currently work and was living with her mother.       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  She underwent right FHL tendon transfer as well as debridement and secondary repair of her right Achilles using semitendinosus allograft with resection of the Duyen deformity and posterior calcifications on 6/25/2024.  She was discharged home the same day with instructions for elevation and nonweightbearing.     Patient was seen on 7/10/2024 stating that she has had some intermittent discomfort but overall is doing well.  She had put some weight on her foot to balance balance getting into the bathroom and also had an episode the previous week when she fell getting out of a car and did put some weight on her foot.  She was down to taking pain medication only 1 maybe 2/day.     Her wound was inspected that time and had some area of questionable superficial epidermolysis but no sign of infection or gross dehiscence.  Sutures removed and Steri-Strips were applied.  She is placed in a very well-padded short leg cast in plantarflexion with instructions for elevation and nonweightbearing and we would let the  wound declare itself.     Patient was seen on 7/25/2024 stating that she had had had increased stinging pain since she had her sutures taken out but was not sure if it is from her surgery or from her neuropathy.  She had been nonweightbearing but had occasionally put some weight on this to balance.     At that time her wound showed some areas of superficial breakdown as previously noted but no sign of full-thickness injury nor infection.  She was instructed on wound care as well as to continue with elevation and nonweightbearing and avoiding pressure on the back of her heel.     Patient was seen on 8/7/2024 that she still had quite a bit of tingling in her hindfoot into her foot.  She had been doing wound care and had not had any drainage.  She had been putting some weight on it with her walker and boot with heel lifts.     At that time I did not see any gross sign of infection and there was some areas of eschar that were debrided.  She was instructed on continuing wound care and allowed to progress to partial weightbearing with her boot and walker with heel lifts.  A tingling sensation reviewed should improve over time but it also had some preoperative neuropathy but did not expect any functional deficit.  She was already on Lyrica.  She was referred to therapy including home therapy as she was not able to get out on her own.     Patient was seen on 8/28/2024 stating that her Achilles pain was improving but still felt like hot needlelike pain in her foot similar to what she had preoperatively but somewhat exacerbated compared with preoperative symptoms.  She had been doing therapy and doing weightbearing on her boot with 2 heel lifts in her walker.  She had continued wound care and had not had any drainage.     At that time there was no sign of any major wound complication or infection and she was instructed continue with wound care and therapy.  Reviewed the neuritic symptoms should settle down over time after  being exacerbated from the preoperative level and she was already on Lyrica.  She was instructed continue with weightbearing with her boot and remove 1 heel lift each week for the next 2 weeks and continue with boot for another week thereafter still with her walker and if doing well then to start trying to adjust an athletic she is long as her wound was healed and use a walker and to continue with therapy.     Patient was seen on 9/25/2024 stating that she was getting better and that her nerve symptoms were improving.  She was no using a boot any longer and stay she rarely uses a walker or cane and does not have either with her today.  She did some mild pain and swelling with activity but overall seems to be improved and was about done with home health therapy at this point.  She had been using Ace wrap's and had not had any drainage from her wound which  appears healed.    At that time she seemed to be doing well and wound was improved/healed anuretic symptoms are improving.  She is let advance activity slowly as tolerated but recommend use of a cane much as possible for balance.  She is referred to outpatient therapy and recommended more sturdy accommodative shoes.    Patient is seen back today 40 ankle is getting better and does really bother her.  She reports she still has some of the neuritic symptoms about 50% of the time that she thinks is from neuropathy.  She has had about 2-day history of some pain along the fifth toe does not recall any specific injury.  She not using a boot or a walker and has not been using a cane either.  She is still working with with outpatient therapy  HPI    ROS: ankle pain  Past Medical History:   Diagnosis Date    Anxiety and depression     Arthritis     Arthritis of back     Bipolar 1 disorder     Cervical disc disorder     Diabetes     GERD (gastroesophageal reflux disease)     HBP (high blood pressure)     History of Bell's palsy     History of kidney stones     IBS (irritable  "bowel syndrome)     Knee swelling     Migraine     Peripheral neuropathy     legs and arms    Poor peripheral circulation     Sleep apnea     no machine    Thoracic disc disorder     Torn Achilles tendon        Physical Exam:   Vitals:    10/30/24 1016   Temp: 97.8 °F (36.6 °C)   Weight: 126 kg (278 lb)   Height: 157.5 cm (62\")   PainSc:   5     Well developed with normal mood.  She was accompanied.  Her right Achilles incision is well-healed there is no palpable defect and she had good plantarflexion strength without focal tenderness.  There was minimal tenderness around the right fifth toe but no warmth or erythema.  There was no gross neuritic symptoms in the hindfoot as part of RSD.      Radiology: 3 views right foot ordered evaluate fifth toe pain reviewed and compared to x-rays from 5/30/2024.  I did not see any obvious fracture.  There are some sesamoids that are without change compared with previous x-rays.      MRI films and report of the right ankle dated 6/10/2024 reviewed which shows full-thickness or high-grade near full-thickness tear/avulsion of the distal Achilles from the posterior calcaneus with proximal retraction with only a few thin far posterior tendon fibers tendon remaining intact.  There is also Duyen deformity with posterior superior calcaneus and posterior spurring.  There is a small to moderate tibiotalar and small posterior subtalar fusion felt to be posttraumatic.  There is patchy marrow edema at the anterior distal tibial plafond and dorsal talar head and neck likely representing bone contusions.     Assessment/Plan: Status post right Achilles surgery as outlined above      1.  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  Right tibiotalar and posterior subtalar joint effusions with marrow edema at the distal plafond and dorsal talar head and neck thought to " represent bone contusions and posttraumatic effusion.   3.  Right fifth toe pain without clear sign of fracture    We discussed treatment going forward and she has been doing well from her surgery.  I think her residual neuritic symptoms may be from her previous neuropathy and so they will see any sign of RSD or indication for revision surgical treatment.    Fifth toe pain may be from occult fracture may be from some of her neuropathy but regardless do not see anything that would change her treatment protocol.    She will continue with therapy to they have plateaued and then continue with home exercises.  She advance activity slowly as tolerated and recommend use of a cane if needed.    If anything worsens she will me know otherwise by mutual agreement I will see her back as needed

## (undated) DEVICE — GLV SURG SIGNATURE ESSENTIAL PF LTX SZ8

## (undated) DEVICE — VIOLET BRAIDED (POLYGLACTIN 910), SYNTHETIC ABSORBABLE SUTURE: Brand: COATED VICRYL

## (undated) DEVICE — Device

## (undated) DEVICE — SPLNT ORTHOGLASS 4INX15FT

## (undated) DEVICE — ELECTRD NDL EZ CLN STD 2.75IN

## (undated) DEVICE — GLV SURG BIOGEL LTX PF 8

## (undated) DEVICE — PENCL ES MEGADINE EZ/CLEAN BUTN W/HOLSTR 10FT

## (undated) DEVICE — PENCL SMOKE/EVAC MEGADYNE TELESCP 10FT

## (undated) DEVICE — SYS PERFUS SEP PLATLT W TIPS CUST

## (undated) DEVICE — ANTIBACTERIAL UNDYED BRAIDED (POLYGLACTIN 910), SYNTHETIC ABSORBABLE SUTURE: Brand: COATED VICRYL

## (undated) DEVICE — PROXIMATE RH ROTATING HEAD SKIN STAPLERS (35 WIDE) CONTAINS 35 STAINLESS STEEL STAPLES: Brand: PROXIMATE

## (undated) DEVICE — 3M™ IOBAN™ 2 ANTIMICROBIAL INCISE DRAPE 6640EZ: Brand: IOBAN™ 2

## (undated) DEVICE — PAD,ABDOMINAL,8"X10",ST,LF: Brand: MEDLINE

## (undated) DEVICE — SUT ETHLN 4/0 PS2 PLSTC 1667G

## (undated) DEVICE — BNDG,ELSTC,MATRIX,STRL,4"X5YD,LF,HOOK&LP: Brand: MEDLINE

## (undated) DEVICE — PK ORTHO MINOR 40

## (undated) DEVICE — BNDG ESMARK STRL 6INX12FT LF

## (undated) DEVICE — UNDERCAST PADDING: Brand: DEROYAL

## (undated) DEVICE — COVER,C-ARM,41X74: Brand: MEDLINE

## (undated) DEVICE — SKIN PREP TRAY W/CHG: Brand: MEDLINE INDUSTRIES, INC.

## (undated) DEVICE — BANDAGE,GAUZE,BULKEE II,4.5"X4.1YD,STRL: Brand: MEDLINE

## (undated) DEVICE — INTENT TO BE USED WITH SUTURE MATERIAL FOR TISSUE CLOSURE: Brand: RICHARD-ALLAN® NEEDLE 1/2 CIRCLE TAPER

## (undated) DEVICE — DISPOSABLE TOURNIQUET CUFF SINGLE BLADDER, SINGLE PORT AND QUICK CONNECT CONNECTOR: Brand: COLOR CUFF

## (undated) DEVICE — DRESSING,GAUZE,XEROFORM,CURAD,1"X8",ST: Brand: CURAD

## (undated) DEVICE — PATIENT RETURN ELECTRODE, SINGLE-USE, CONTACT QUALITY MONITORING, ADULT, WITH 9FT CORD, FOR PATIENTS WEIGING OVER 33LBS. (15KG): Brand: MEGADYNE

## (undated) DEVICE — APPL CHLORAPREP HI/LITE 26ML ORNG

## (undated) DEVICE — DRP SURG U/DRP INVISISHIELD PA 48X52IN

## (undated) DEVICE — PREP SOL POVIDONE/IODINE BT 4OZ

## (undated) DEVICE — SUT VIC 1 CT1 36IN J947H

## (undated) DEVICE — TRAP FLD MINIVAC MEGADYNE 100ML

## (undated) DEVICE — INTENDED FOR TISSUE SEPARATION, AND OTHER PROCEDURES THAT REQUIRE A SHARP SURGICAL BLADE TO PUNCTURE OR CUT.: Brand: BARD-PARKER ® CARBON RIB-BACK BLADES